# Patient Record
Sex: FEMALE | Race: WHITE | URBAN - METROPOLITAN AREA
[De-identification: names, ages, dates, MRNs, and addresses within clinical notes are randomized per-mention and may not be internally consistent; named-entity substitution may affect disease eponyms.]

---

## 2022-03-16 PROBLEM — O09.893 SHORT INTERVAL BETWEEN PREGNANCIES AFFECTING PREGNANCY IN THIRD TRIMESTER, ANTEPARTUM: Status: ACTIVE | Noted: 2022-03-16

## 2022-03-16 PROBLEM — O09.93 HIGH-RISK PREGNANCY IN THIRD TRIMESTER: Status: ACTIVE | Noted: 2022-03-16

## 2022-03-16 PROBLEM — O24.410 DIET CONTROLLED GESTATIONAL DIABETES MELLITUS (GDM) IN THIRD TRIMESTER: Status: ACTIVE | Noted: 2022-03-16

## 2022-03-16 PROBLEM — O99.013 ANEMIA DURING PREGNANCY IN THIRD TRIMESTER: Status: ACTIVE | Noted: 2022-03-16

## 2022-03-24 PROBLEM — O99.013 ANEMIA DURING PREGNANCY IN THIRD TRIMESTER: Status: ACTIVE | Noted: 2022-03-16

## 2022-03-24 PROBLEM — O24.410 DIET CONTROLLED GESTATIONAL DIABETES MELLITUS (GDM) IN THIRD TRIMESTER: Status: ACTIVE | Noted: 2022-03-16

## 2022-03-24 PROBLEM — O09.93 HIGH-RISK PREGNANCY IN THIRD TRIMESTER: Status: ACTIVE | Noted: 2022-03-16

## 2022-03-24 PROBLEM — O09.893 SHORT INTERVAL BETWEEN PREGNANCIES AFFECTING PREGNANCY IN THIRD TRIMESTER, ANTEPARTUM: Status: ACTIVE | Noted: 2022-03-16

## 2022-04-20 ENCOUNTER — HOSPITAL ENCOUNTER (OUTPATIENT)
Age: 37
Discharge: HOME | End: 2022-04-20
Attending: OBSTETRICS & GYNECOLOGY | Admitting: OBSTETRICS & GYNECOLOGY
Payer: OTHER GOVERNMENT

## 2022-04-20 VITALS
SYSTOLIC BLOOD PRESSURE: 110 MMHG | DIASTOLIC BLOOD PRESSURE: 69 MMHG | RESPIRATION RATE: 18 BRPM | TEMPERATURE: 98.6 F | OXYGEN SATURATION: 99 % | HEART RATE: 95 BPM

## 2022-04-20 PROBLEM — O41.03X0 OLIGOHYDRAMNIOS IN THIRD TRIMESTER: Status: ACTIVE | Noted: 2022-04-20

## 2022-04-20 PROBLEM — Z03.71 ENCOUNTER FOR SUSPECTED PREMATURE RUPTURE OF AMNIOTIC MEMBRANES, WITH RUPTURE OF MEMBRANES NOT FOUND: Status: ACTIVE | Noted: 2022-04-20

## 2022-04-20 LAB
A1 MICROGLOB PLACENTAL VAG QL: NEGATIVE
CONTROL LINE PRESENT?: NORMAL
EXPIRATION DATE: NORMAL
INTERNAL NEGATIVE CONTROL: NORMAL
KIT LOT NO.: NORMAL

## 2022-04-20 PROCEDURE — 59025 FETAL NON-STRESS TEST: CPT

## 2022-04-20 PROCEDURE — 84112 EVAL AMNIOTIC FLUID PROTEIN: CPT | Performed by: OBSTETRICS & GYNECOLOGY

## 2022-04-20 PROCEDURE — 99283 EMERGENCY DEPT VISIT LOW MDM: CPT

## 2022-04-20 NOTE — PROGRESS NOTES
Pt d/c home per Dr. James Jones order. Written and verbal d/c instructions and education given on general pregnancy. Pt verbalizes understanding and denies any questions. Will follow up with OB on Friday 4/22/22. Pt ambulated off [facility] escorted by self.

## 2022-04-20 NOTE — H&P
History & Physical    Name: Siddharth Kaufman MRN: [id number]  SSN: [id number]    YOB: 1985  Age: 31 y.o. Sex: female        Subjective: lof  32 yo  at 36+2wks presents from [facility] for evaluation of lof. Pt was seen at [facility] for EFW/BPP due to GDM, short interval pregnancy, and anemia. She was noted to have a low JOHN PAUL of 6.9cm and c/o \"wetness\" for a few weeks. She denies ctxs or vb. BPP was  with EFW 37%. Estimated Date of Delivery: 22  OB History    Para Term  AB Living   5 3 3   1 3   SAB IAB Ectopic Molar Multiple Live Births   1         3      # Outcome Date GA Lbr Lupillo/2nd Weight Sex Delivery Anes PTL Lv   5 Current            4 Term 21 39w2d / 00:18 3.646 kg F Vag-Spont EPI N BILLY   3 SAB 20 5w0d          2 Term 05/15/18 40w3d 10:51 / 00:12 3.379 kg F Vag-Spont Local N BILLY      Complications: Meconium staining   1 Term 17 40w0d 08:01 / 02:23 3. 118 kg F Vag-Spont EPI N BILLY       Past Medical History:   Diagnosis Date    Anemia      Past Surgical History:   Procedure Laterality Date    HX WISDOM TEETH EXTRACTION       Social History     Occupational History    Not on file   Tobacco Use    Smoking status: Never Smoker    Smokeless tobacco: Never Used   Substance and Sexual Activity    Alcohol use: Not Currently    Drug use: Never    Sexual activity: Not on file     History reviewed. No pertinent family history. Allergies   Allergen Reactions    Amoxicillin Hives     Pt sts had rash last time she had amoxicillin for strep     Prior to Admission medications    Medication Sig Start Date End Date Taking? Authorizing Provider   ferrous sulfate (IRON PO) Take  by mouth. Yes Provider, Historical   PNV XM.41/ONTRYHH fum/folic ac (PRENATAL PO) Take  by mouth daily. Yes Provider, Historical   hydrocortisone (HYTONE) 2.5 % topical cream Insert  into rectum.    Yes Provider, Historical   FreeStyle Lite Strips strip USE TO CHECK BLOOD SUGAR FOUR TIMES DAILY  Patient not taking: Reported on 2022 3/2/22   Provider, Historical   FreeStyle Lite Meter monitoring kit USE AS DIRECTED TO CHECK LEVELS FOUR TIMES DAILY  Patient not taking: Reported on 2022 3/11/22   Provider, Historical   FreeStyle Lancets 28 gauge misc USE AS DIRECTED TO CHECK LEVELS FOUR TIMES DAILY  Patient not taking: Reported on 2022 3/11/22   Provider, Historical        Review of Systems: Pertinent items are noted in HPI. 10 point ROS is otherwise negative. Objective:     Vitals:  Vitals:    22 1724   BP: 110/69   Pulse: 95   Resp: 18   Temp: 98.6 °F (37 °C)   SpO2: 99%        Physical Exam:  Patient without distress. Abdomen: soft, nontender  Fundus: soft and non tender  Perineum: blood absent, amniotic fluid absent  Lower Extremities:  - Edema 1+  Membranes:  Intact, Amnisure NEGATIVE  Fetal Heart Rate: Baseline: 130s per minute  Variability: moderate  Accelerations: yes  Decelerations: none  Uterine contractions: none    Prenatal Labs:   No results found for: ABORH, RUBELLAEXT, GRBSEXT, HBSAGEXT, HIVEXT, RPREXT, GONNOEXT, CHLAMEXT, ABORHEXT, RUBELLAEXT, GRBSEXT, HBSAGEXT, HIVEXT, RPREXT, GONNOEXT, CHLAMEXT      Assessment/Plan: 32 yo  at 36+2wks presents for lof. Amnisure resulted negative. I reviewed and interpreted the NST as a category 1 tracing. Active Problems:    Encounter for suspected premature rupture of amniotic membranes, with rupture of membranes not found (2022)         Plan:   MFM recommended in office BPP on Friday. This was communicated to the pt. Routine OB precautions.

## 2022-04-20 NOTE — PROGRESS NOTES
31year old  at 36.2 weeks gestation presents to [place] via ambulatory sent from [facility] for rule out rupture of membranes. Pt denies vaginal bleeding or contractions. EFHT 140s and reactive, no contractions noted on toco or palpation. Dr. Whitten Parent aware of pt arrival and negative amnisure result.

## 2022-04-20 NOTE — DISCHARGE INSTRUCTIONS
Patient Education        Learning About Pregnancy  Your Care Instructions     Your health in the early weeks of your pregnancy is particularly important for your baby's health. Take good care of yourself. Anything you do that harms your body can also harm your baby. Make sure to go to all of your doctor appointments. Regular checkups will help keep you and your baby healthy. How can you care for yourself at home? Diet    · Eat a balanced diet. Make sure your diet includes plenty of beans, peas, and leafy green vegetables.     · Do not skip meals or go for many hours without eating. If you are nauseated, try to eat a small, healthy snack every 2 to 3 hours.     · Do not eat fish that has a high level of mercury, such as shark, swordfish, or mackerel. Do not eat more than one can of tuna each week.     · Drink plenty of fluids. If you have kidney, heart, or liver disease and have to limit fluids, talk with your doctor before you increase the amount of fluids you drink.     · Cut down on caffeine, such as coffee, tea, and cola.     · Do not drink alcohol, such as beer, wine, or hard liquor.     · Take a multivitamin that contains at least 400 micrograms (mcg) of folic acid to help prevent birth defects. Fortified cereal and whole wheat bread are good additional sources of folic acid.     · Increase the calcium in your diet. Try to drink a quart of skim milk each day. You may also take calcium supplements and choose foods such as cheese and yogurt. Lifestyle    · Make sure you go to your follow-up appointments.     · Get plenty of rest. You may be unusually tired while you are pregnant.     · Get at least 30 minutes of exercise on most days of the week. Walking is a good choice. If you have not exercised in the past, start out slowly. Take several short walks each day.     · Do not smoke. If you need help quitting, talk to your doctor about stop-smoking programs.  These can increase your chances of quitting for good.     · Do not touch cat feces or litter boxes. Also, wash your hands after you handle raw meat, and fully cook all meat before you eat it. Wear gloves when you work in the yard or garden, and wash your hands well when you are done. Cat feces, raw or undercooked meat, and contaminated dirt can cause an infection that may harm your baby or lead to a miscarriage.     · Avoid things that can make your body too hot and may be harmful to your baby, such as a hot tub or sauna. Or talk with your doctor before doing anything that raises your body temperature. Your doctor can tell you if it's safe.     · Avoid chemical fumes, paint fumes, or poisons.     · Do not use illegal drugs, marijuana, or alcohol. Medicines    · Review all of your medicines with your doctor. Some of your routine medicines may need to be changed to protect your baby.     · Use acetaminophen (Tylenol) to relieve minor problems, such as a mild headache or backache or a mild fever with cold symptoms. Do not use nonsteroidal anti-inflammatory drugs (NSAIDs), such as ibuprofen (Advil, Motrin) or naproxen (Aleve), unless your doctor says it is okay.     · Do not take two or more pain medicines at the same time unless the doctor told you to. Many pain medicines have acetaminophen, which is Tylenol. Too much acetaminophen (Tylenol) can be harmful.     · Take your medicines exactly as prescribed. Call your doctor if you think you are having a problem with your medicine. To manage morning sickness    · If you feel sick when you first wake up, try eating a small snack (such as crackers) before you get out of bed. Allow some time to digest the snack, and then get out of bed slowly.     · Do not skip meals or go for long periods without eating.  An empty stomach can make nausea worse.     · Eat small, frequent meals instead of three large meals each day.     · Drink plenty of fluids.     · Eat foods that are high in protein but low in fat.     · If you are taking iron supplements, ask your doctor if they are necessary. Iron can make nausea worse.     · Avoid any smells, such as coffee, that make you feel sick.     · Get lots of rest. Morning sickness may be worse when you are tired. Follow-up care is a key part of your treatment and safety. Be sure to make and go to all appointments, and call your doctor if you are having problems. It's also a good idea to know your test results and keep a list of the medicines you take. Where can you learn more? Go to http://www.gray.com/  Enter Y263 in the search box to learn more about \"Learning About Pregnancy. \"  Current as of: June 16, 2021               Content Version: 13.2  © 2006-2022 [employer]. Care instructions adapted under license by [employer] (which disclaims liability or warranty for this information). If you have questions about a medical condition or this instruction, always ask your healthcare professional. [address] any warranty or liability for your use of this information.

## 2022-05-21 ENCOUNTER — HOSPITAL ENCOUNTER (INPATIENT)
Age: 37
LOS: 1 days | Discharge: HOME | End: 2022-05-22
Attending: OBSTETRICS & GYNECOLOGY | Admitting: OBSTETRICS & GYNECOLOGY
Payer: OTHER GOVERNMENT

## 2022-05-21 ENCOUNTER — ANESTHESIA (OUTPATIENT)
Dept: LABOR AND DELIVERY | Age: 37
End: 2022-05-21
Payer: OTHER GOVERNMENT

## 2022-05-21 ENCOUNTER — ANESTHESIA EVENT (OUTPATIENT)
Dept: LABOR AND DELIVERY | Age: 37
End: 2022-05-21
Payer: OTHER GOVERNMENT

## 2022-05-21 PROBLEM — O47.9 UTERINE CONTRACTIONS: Status: ACTIVE | Noted: 2022-05-21

## 2022-05-21 LAB
ABO + RH BLD: NORMAL
BLOOD GROUP ANTIBODIES SERPL: NORMAL
ERYTHROCYTE [DISTWIDTH] IN BLOOD BY AUTOMATED COUNT: 14.6 % (ref 11.9–14.6)
HCT VFR BLD AUTO: 36.6 % (ref 35.8–46.3)
HGB BLD-MCNC: 12.5 G/DL (ref 11.7–15.4)
MCH RBC QN AUTO: 30.2 PG (ref 26.1–32.9)
MCHC RBC AUTO-ENTMCNC: 34.2 G/DL (ref 31.4–35)
MCV RBC AUTO: 88.4 FL (ref 79.6–97.8)
NRBC # BLD: 0 K/UL (ref 0–0.2)
PLATELET # BLD AUTO: 242 K/UL (ref 150–450)
PMV BLD AUTO: 9.6 FL (ref 9.4–12.3)
RBC # BLD AUTO: 4.14 M/UL (ref 4.05–5.2)
SPECIMEN EXP DATE BLD: NORMAL
WBC # BLD AUTO: 10.5 K/UL (ref 4.3–11.1)

## 2022-05-21 PROCEDURE — 99283 EMERGENCY DEPT VISIT LOW MDM: CPT

## 2022-05-21 PROCEDURE — 2500000003 HC RX 250 WO HCPCS: Performed by: ANESTHESIOLOGY

## 2022-05-21 PROCEDURE — 2580000003 HC RX 258: Performed by: OBSTETRICS & GYNECOLOGY

## 2022-05-21 PROCEDURE — 7100000001 HC PACU RECOVERY - ADDTL 15 MIN

## 2022-05-21 PROCEDURE — 00HU33Z INSERTION OF INFUSION DEVICE INTO SPINAL CANAL, PERCUTANEOUS APPROACH: ICD-10-PCS | Performed by: ANESTHESIOLOGY

## 2022-05-21 PROCEDURE — 6360000002 HC RX W HCPCS: Performed by: REGISTERED NURSE

## 2022-05-21 PROCEDURE — 85027 COMPLETE CBC AUTOMATED: CPT

## 2022-05-21 PROCEDURE — 51701 INSERT BLADDER CATHETER: CPT

## 2022-05-21 PROCEDURE — 7210000100 HC LABOR FEE PER 1 HR

## 2022-05-21 PROCEDURE — 7220000101 HC DELIVERY VAGINAL/SINGLE

## 2022-05-21 PROCEDURE — 1100000000 HC RM PRIVATE

## 2022-05-21 PROCEDURE — 3700000156 HC EPIDURAL ANESTHESIA

## 2022-05-21 PROCEDURE — 7100000000 HC PACU RECOVERY - FIRST 15 MIN

## 2022-05-21 PROCEDURE — 10907ZC DRAINAGE OF AMNIOTIC FLUID, THERAPEUTIC FROM PRODUCTS OF CONCEPTION, VIA NATURAL OR ARTIFICIAL OPENING: ICD-10-PCS | Performed by: OBSTETRICS & GYNECOLOGY

## 2022-05-21 PROCEDURE — 4500000002 HC ER NO CHARGE

## 2022-05-21 PROCEDURE — 0HQ9XZZ REPAIR PERINEUM SKIN, EXTERNAL APPROACH: ICD-10-PCS | Performed by: OBSTETRICS & GYNECOLOGY

## 2022-05-21 PROCEDURE — 86901 BLOOD TYPING SEROLOGIC RH(D): CPT

## 2022-05-21 PROCEDURE — 6360000002 HC RX W HCPCS: Performed by: OBSTETRICS & GYNECOLOGY

## 2022-05-21 PROCEDURE — 6370000000 HC RX 637 (ALT 250 FOR IP): Performed by: OBSTETRICS & GYNECOLOGY

## 2022-05-21 PROCEDURE — 59300 EPISIOTOMY OR VAGINAL REPAIR: CPT

## 2022-05-21 PROCEDURE — 4A1HXCZ MONITORING OF PRODUCTS OF CONCEPTION, CARDIAC RATE, EXTERNAL APPROACH: ICD-10-PCS | Performed by: OBSTETRICS & GYNECOLOGY

## 2022-05-21 PROCEDURE — 2500000003 HC RX 250 WO HCPCS: Performed by: REGISTERED NURSE

## 2022-05-21 RX ORDER — OXYCODONE HYDROCHLORIDE 5 MG/1
10 TABLET ORAL EVERY 4 HOURS PRN
Status: DISCONTINUED | OUTPATIENT
Start: 2022-05-21 | End: 2022-05-23 | Stop reason: HOSPADM

## 2022-05-21 RX ORDER — SODIUM CHLORIDE, SODIUM LACTATE, POTASSIUM CHLORIDE, CALCIUM CHLORIDE 600; 310; 30; 20 MG/100ML; MG/100ML; MG/100ML; MG/100ML
INJECTION, SOLUTION INTRAVENOUS CONTINUOUS
Status: DISCONTINUED | OUTPATIENT
Start: 2022-05-21 | End: 2022-05-22

## 2022-05-21 RX ORDER — SODIUM CHLORIDE, SODIUM LACTATE, POTASSIUM CHLORIDE, AND CALCIUM CHLORIDE .6; .31; .03; .02 G/100ML; G/100ML; G/100ML; G/100ML
500 INJECTION, SOLUTION INTRAVENOUS PRN
Status: DISCONTINUED | OUTPATIENT
Start: 2022-05-21 | End: 2022-05-21 | Stop reason: HOSPADM

## 2022-05-21 RX ORDER — SODIUM CHLORIDE 9 MG/ML
25 INJECTION, SOLUTION INTRAVENOUS PRN
Status: DISCONTINUED | OUTPATIENT
Start: 2022-05-21 | End: 2022-05-21 | Stop reason: HOSPADM

## 2022-05-21 RX ORDER — ROPIVACAINE HYDROCHLORIDE 2 MG/ML
INJECTION, SOLUTION EPIDURAL; INFILTRATION; PERINEURAL CONTINUOUS PRN
Status: DISCONTINUED | OUTPATIENT
Start: 2022-05-21 | End: 2022-05-21 | Stop reason: SDUPTHER

## 2022-05-21 RX ORDER — MAGNESIUM CARB/ALUMINUM HYDROX 105-160MG
240 TABLET,CHEWABLE ORAL DAILY PRN
Status: DISCONTINUED | OUTPATIENT
Start: 2022-05-21 | End: 2022-05-21

## 2022-05-21 RX ORDER — LANOLIN 100 %
OINTMENT (GRAM) TOPICAL PRN
Status: DISCONTINUED | OUTPATIENT
Start: 2022-05-21 | End: 2022-05-23 | Stop reason: HOSPADM

## 2022-05-21 RX ORDER — HYDROCODONE BITARTRATE AND ACETAMINOPHEN 5; 325 MG/1; MG/1
1 TABLET ORAL EVERY 4 HOURS PRN
Status: DISCONTINUED | OUTPATIENT
Start: 2022-05-21 | End: 2022-05-21

## 2022-05-21 RX ORDER — ONDANSETRON 2 MG/ML
4 INJECTION INTRAMUSCULAR; INTRAVENOUS EVERY 6 HOURS PRN
Status: DISCONTINUED | OUTPATIENT
Start: 2022-05-21 | End: 2022-05-23 | Stop reason: HOSPADM

## 2022-05-21 RX ORDER — FAMOTIDINE 20 MG/1
20 TABLET, FILM COATED ORAL 2 TIMES DAILY
Status: DISCONTINUED | OUTPATIENT
Start: 2022-05-21 | End: 2022-05-23 | Stop reason: HOSPADM

## 2022-05-21 RX ORDER — POLYETHYLENE GLYCOL 3350 17 G/17G
17 POWDER, FOR SOLUTION ORAL DAILY
Status: DISCONTINUED | OUTPATIENT
Start: 2022-05-22 | End: 2022-05-23 | Stop reason: HOSPADM

## 2022-05-21 RX ORDER — IBUPROFEN 800 MG/1
TABLET ORAL
Status: DISPENSED
Start: 2022-05-21 | End: 2022-05-22

## 2022-05-21 RX ORDER — SODIUM CHLORIDE 0.9 % (FLUSH) 0.9 %
5-40 SYRINGE (ML) INJECTION EVERY 12 HOURS SCHEDULED
Status: DISCONTINUED | OUTPATIENT
Start: 2022-05-21 | End: 2022-05-23 | Stop reason: HOSPADM

## 2022-05-21 RX ORDER — ACETAMINOPHEN 325 MG/1
650 TABLET ORAL EVERY 4 HOURS PRN
Status: DISCONTINUED | OUTPATIENT
Start: 2022-05-21 | End: 2022-05-21 | Stop reason: HOSPADM

## 2022-05-21 RX ORDER — IBUPROFEN 800 MG/1
800 TABLET ORAL EVERY 6 HOURS
Status: DISCONTINUED | OUTPATIENT
Start: 2022-05-21 | End: 2022-05-23 | Stop reason: HOSPADM

## 2022-05-21 RX ORDER — ONDANSETRON 4 MG/1
8 TABLET, ORALLY DISINTEGRATING ORAL EVERY 8 HOURS PRN
Status: DISCONTINUED | OUTPATIENT
Start: 2022-05-21 | End: 2022-05-23 | Stop reason: HOSPADM

## 2022-05-21 RX ORDER — IBUPROFEN 800 MG/1
800 TABLET ORAL EVERY 8 HOURS SCHEDULED
Status: DISCONTINUED | OUTPATIENT
Start: 2022-05-21 | End: 2022-05-21 | Stop reason: HOSPADM

## 2022-05-21 RX ORDER — SODIUM CHLORIDE, SODIUM LACTATE, POTASSIUM CHLORIDE, CALCIUM CHLORIDE 600; 310; 30; 20 MG/100ML; MG/100ML; MG/100ML; MG/100ML
INJECTION, SOLUTION INTRAVENOUS CONTINUOUS
Status: DISCONTINUED | OUTPATIENT
Start: 2022-05-21 | End: 2022-05-21 | Stop reason: HOSPADM

## 2022-05-21 RX ORDER — SODIUM CHLORIDE 0.9 % (FLUSH) 0.9 %
5-40 SYRINGE (ML) INJECTION PRN
Status: DISCONTINUED | OUTPATIENT
Start: 2022-05-21 | End: 2022-05-21 | Stop reason: HOSPADM

## 2022-05-21 RX ORDER — SODIUM CHLORIDE 0.9 % (FLUSH) 0.9 %
5-40 SYRINGE (ML) INJECTION EVERY 12 HOURS SCHEDULED
Status: DISCONTINUED | OUTPATIENT
Start: 2022-05-21 | End: 2022-05-21 | Stop reason: HOSPADM

## 2022-05-21 RX ORDER — SODIUM CHLORIDE 0.9 % (FLUSH) 0.9 %
5-40 SYRINGE (ML) INJECTION PRN
Status: DISCONTINUED | OUTPATIENT
Start: 2022-05-21 | End: 2022-05-23 | Stop reason: HOSPADM

## 2022-05-21 RX ORDER — DOCUSATE SODIUM 100 MG/1
100 CAPSULE, LIQUID FILLED ORAL 2 TIMES DAILY
Status: DISCONTINUED | OUTPATIENT
Start: 2022-05-21 | End: 2022-05-21

## 2022-05-21 RX ORDER — SODIUM CHLORIDE, SODIUM LACTATE, POTASSIUM CHLORIDE, AND CALCIUM CHLORIDE .6; .31; .03; .02 G/100ML; G/100ML; G/100ML; G/100ML
1000 INJECTION, SOLUTION INTRAVENOUS PRN
Status: DISCONTINUED | OUTPATIENT
Start: 2022-05-21 | End: 2022-05-21 | Stop reason: HOSPADM

## 2022-05-21 RX ORDER — ONDANSETRON 2 MG/ML
4 INJECTION INTRAMUSCULAR; INTRAVENOUS EVERY 6 HOURS PRN
Status: DISCONTINUED | OUTPATIENT
Start: 2022-05-21 | End: 2022-05-21 | Stop reason: HOSPADM

## 2022-05-21 RX ORDER — SODIUM CHLORIDE 9 MG/ML
INJECTION, SOLUTION INTRAVENOUS PRN
Status: DISCONTINUED | OUTPATIENT
Start: 2022-05-21 | End: 2022-05-22

## 2022-05-21 RX ORDER — SIMETHICONE 80 MG
80 TABLET,CHEWABLE ORAL EVERY 6 HOURS PRN
Status: DISCONTINUED | OUTPATIENT
Start: 2022-05-21 | End: 2022-05-23 | Stop reason: HOSPADM

## 2022-05-21 RX ORDER — LIDOCAINE HYDROCHLORIDE AND EPINEPHRINE 15; 5 MG/ML; UG/ML
INJECTION, SOLUTION EPIDURAL PRN
Status: DISCONTINUED | OUTPATIENT
Start: 2022-05-21 | End: 2022-05-21 | Stop reason: SDUPTHER

## 2022-05-21 RX ORDER — HYDROMORPHONE HYDROCHLORIDE 1 MG/ML
0.5 INJECTION, SOLUTION INTRAMUSCULAR; INTRAVENOUS; SUBCUTANEOUS
Status: DISCONTINUED | OUTPATIENT
Start: 2022-05-21 | End: 2022-05-23 | Stop reason: HOSPADM

## 2022-05-21 RX ORDER — DOCUSATE SODIUM 100 MG/1
100 CAPSULE, LIQUID FILLED ORAL 2 TIMES DAILY PRN
Status: DISCONTINUED | OUTPATIENT
Start: 2022-05-21 | End: 2022-05-23 | Stop reason: HOSPADM

## 2022-05-21 RX ORDER — ACETAMINOPHEN 500 MG
1000 TABLET ORAL EVERY 6 HOURS
Status: DISCONTINUED | OUTPATIENT
Start: 2022-05-21 | End: 2022-05-23 | Stop reason: HOSPADM

## 2022-05-21 RX ORDER — EPHEDRINE SULFATE/0.9% NACL/PF 50 MG/5 ML
SYRINGE (ML) INTRAVENOUS PRN
Status: DISCONTINUED | OUTPATIENT
Start: 2022-05-21 | End: 2022-05-21 | Stop reason: SDUPTHER

## 2022-05-21 RX ORDER — OXYCODONE HYDROCHLORIDE 5 MG/1
5 TABLET ORAL EVERY 4 HOURS PRN
Status: DISCONTINUED | OUTPATIENT
Start: 2022-05-21 | End: 2022-05-23 | Stop reason: HOSPADM

## 2022-05-21 RX ADMIN — Medication 2 MILLI-UNITS/MIN: at 13:38

## 2022-05-21 RX ADMIN — Medication: at 23:40

## 2022-05-21 RX ADMIN — ROPIVACAINE HYDROCHLORIDE 8 ML/HR: 2 INJECTION, SOLUTION EPIDURAL; INFILTRATION; PERINEURAL at 16:31

## 2022-05-21 RX ADMIN — LIDOCAINE HYDROCHLORIDE,EPINEPHRINE BITARTRATE 5 ML: 15; .005 INJECTION, SOLUTION EPIDURAL; INFILTRATION; INTRACAUDAL; PERINEURAL at 16:24

## 2022-05-21 RX ADMIN — Medication 15 MG: at 16:37

## 2022-05-21 RX ADMIN — WITCH HAZEL 40 EACH: 500 SOLUTION RECTAL; TOPICAL at 23:38

## 2022-05-21 RX ADMIN — SODIUM CHLORIDE, POTASSIUM CHLORIDE, SODIUM LACTATE AND CALCIUM CHLORIDE: 600; 310; 30; 20 INJECTION, SOLUTION INTRAVENOUS at 13:38

## 2022-05-21 RX ADMIN — Medication 909 MILLI-UNITS/MIN: at 18:19

## 2022-05-21 RX ADMIN — SODIUM CHLORIDE, POTASSIUM CHLORIDE, SODIUM LACTATE AND CALCIUM CHLORIDE 500 ML: 600; 310; 30; 20 INJECTION, SOLUTION INTRAVENOUS at 13:45

## 2022-05-21 RX ADMIN — IBUPROFEN 800 MG: 800 TABLET, FILM COATED ORAL at 21:34

## 2022-05-21 RX ADMIN — OXYCODONE 5 MG: 5 TABLET ORAL at 23:39

## 2022-05-21 ASSESSMENT — PAIN DESCRIPTION - DESCRIPTORS: DESCRIPTORS: CRAMPING

## 2022-05-21 ASSESSMENT — PAIN SCALES - GENERAL: PAINLEVEL_OUTOF10: 5

## 2022-05-21 ASSESSMENT — PAIN DESCRIPTION - LOCATION: LOCATION: ABDOMEN

## 2022-05-21 ASSESSMENT — PAIN DESCRIPTION - ORIENTATION: ORIENTATION: ANTERIOR;LOWER

## 2022-05-21 NOTE — PROGRESS NOTES
1037 Pt. Arrived to [place] [address] for rule out labor. EFM and toco applied per [facility]. Dr. Jennyfer Meier called per RN.  7713 Dr. Jennyfer Meier at bedside reviewing FHR tracing. SVE per Dr. Jennyfer Meier. Admit to inpatient orders received. 1127 Pt. Transferred to L&D room 433 ambulatory with RN. Pt. Prefers to be ambulatory. Mikie Dodsonudent telemetry applied per RN. 46 Dr. Federico Junior at bedside reviewing FHR tracing. Pt. Declines AROM at this time. Orders for Pitocin per protocol received. 1338 Pitocin started at 2mu as ordered. Hunzepad 139 per RN. 80/-2. Pt. Desires natural childbirth at this time. 2 [place] Pope per RN. 80/-2. Pt. Considering AROM at this time. Pt. Requesting epidural at this time. Fluid bolus started. Dr. Daisy Heck and Dennis Gamino called per RN for epidural placement. Raven Heck and Evans Del Rosario CRNA at bedside for epidural placement. See anesthesia record. 1650 Pt. Requesting AROM at this time. Dr. Federico Junior at bedside reviewing FHR tracing. SVE per Dr. Federico Junior. AROM per Dr. Federico Junior. Clear fluids noted. 1733 SVE per RN 7/80/0. Pt. Repositioned to right side with peanut ball. 1750 Pt. Repositioned to side lying release on the left. 1758 Pt. Repositioned to side lying release on the right. 1800 SVE per RN. 10/100/+2. Dr. Federico Junior called per RN for delivery. 1816  of viable boy. Apgars 8/9. See delivery.

## 2022-05-21 NOTE — ANESTHESIA PRE PROCEDURE
Department of Anesthesiology  Preprocedure Note       Name:  Boyb Andrade   Age:  31 y.o.  :  1985                                          MRN:  [id number]         Date:  2022      Surgeon: * No surgeons listed *    Procedure: * No procedures listed *    Medications prior to admission:   Prior to Admission medications    Medication Sig Start Date End Date Taking?  Authorizing Provider   Prenatal Vit w/Lj-Bemuuregp-YE (PNV PO) Take 1 tablet by mouth daily   Yes Historical Provider, MD   hydrocortisone 2.5 % cream Place rectally    Ar Automatic Reconciliation       Current medications:    Current Facility-Administered Medications   Medication Dose Route Frequency Provider Last Rate Last Admin    lactated ringers infusion   IntraVENous Continuous Zack Renteria  mL/hr at 22 1338 New Bag at 22 1338    lactated ringers bolus  500 mL IntraVENous PRN Zack Renteria MD        Or    lactated ringers bolus  1,000 mL IntraVENous PRN Zack Renteria MD        sodium chloride flush 0.9 % injection 5-40 mL  5-40 mL IntraVENous 2 times per day Zack Renteria MD        sodium chloride flush 0.9 % injection 5-40 mL  5-40 mL IntraVENous PRN Zack Renteria MD        0.9 % sodium chloride infusion  25 mL IntraVENous PRN Zack Renteria MD        butorphanol (STADOL) injection 1 mg  1 mg IntraVENous Q3H PRN Zack Renteria MD        ondansetron [facility]) injection 4 mg  4 mg IntraVENous Q6H PRN Zack Renteria MD        acetaminophen (TYLENOL) tablet 650 mg  650 mg Oral Q4H PRN Zack Renteria MD        ibuprofen (ADVIL;MOTRIN) tablet 800 mg  800 mg Oral 3 times per day Zack Renteria MD        HYDROcodone-acetaminophen (NORCO) 5-325 MG per tablet 1 tablet  1 tablet Oral Q4H PRN Zack Renteria MD        benzocaine-menthol (DERMOPLAST) 20-0.5 % spray   Topical PRN Zack Renteria MD        docusate sodium (COLACE) capsule 100 mg  100 mg Oral BID MD Ej Falcon oxytocin (PITOCIN) 30 units in 500 mL infusion  1-20 nisa-units/min IntraVENous Continuous José Miguel Archer MD 4 mL/hr at 05/21/22 1415 4 nisa-units/min at 05/21/22 1415       Allergies: Allergies   Allergen Reactions    Amoxicillin Hives     Pt sts had rash last time she had amoxicillin for strep       Problem List:    Patient Active Problem List   Diagnosis Code    High-risk pregnancy in third trimester O09.93    Diet controlled gestational diabetes mellitus (GDM) in third trimester O20.18    Anemia during pregnancy in third trimester O99.013    Short interval between pregnancies affecting pregnancy in third trimester, antepartum O09.893    Oligohydramnios in third trimester O41. 5X0    Encounter for suspected premature rupture of amniotic membranes, with rupture of membranes not found Z03.71    Uterine contractions O47.9    Labor abnormal O62.9       Past Medical History:        Diagnosis Date    Anemia        Past Surgical History:        Procedure Laterality Date    WISDOM TOOTH EXTRACTION         Social History:    Social History     Tobacco Use    Smoking status: Never Smoker    Smokeless tobacco: Never Used   Substance Use Topics    Alcohol use: Not Currently                                Counseling given: Not Answered      Vital Signs (Current):   Vitals:    05/21/22 1415 05/21/22 1623 05/21/22 1625 05/21/22 1628   BP: 134/81 120/72 114/71 124/66   Pulse: 120 100 101 104   Resp:       Temp:       TempSrc:       Weight:       Height:                                                  BP Readings from Last 3 Encounters:   05/21/22 124/66   03/16/22 112/72       NPO Status:                                                                                 BMI:   Wt Readings from Last 3 Encounters:   05/21/22 170 lb (77.1 kg)     Body mass index is 29.18 kg/m².     CBC:   Lab Results   Component Value Date    WBC 10.5 05/21/2022    RBC 4.14 05/21/2022    HGB 12.5 05/21/2022    HCT 36.6 05/21/2022 MCV 88.4 05/21/2022    RDW 14.6 05/21/2022     05/21/2022       CMP: No results found for: NA, K, CL, CO2, BUN, CREATININE, GFRAA, AGRATIO, LABGLOM, GLUCOSE, GLU, PROT, CALCIUM, BILITOT, ALKPHOS, AST, ALT    POC Tests: No results for input(s): POCGLU, POCNA, POCK, POCCL, POCBUN, POCHEMO, POCHCT in the last 72 hours. Coags: No results found for: PROTIME, INR, APTT    HCG (If Applicable): No results found for: PREGTESTUR, PREGSERUM, HCG, HCGQUANT     ABGs: No results found for: PHART, PO2ART, YVK3EWV, NLG5JOK, BEART, X8DTMYCW     Type & Screen (If Applicable):  No results found for: LABABO, LABRH    Drug/Infectious Status (If Applicable):  No results found for: HIV, HEPCAB    COVID-19 Screening (If Applicable): No results found for: COVID19        Anesthesia Evaluation  Patient summary reviewed and Nursing notes reviewed no history of anesthetic complications:   Airway: Mallampati: II          Dental: normal exam         Pulmonary:Negative Pulmonary ROS breath sounds clear to auscultation                             Cardiovascular:Negative CV ROS  Exercise tolerance: good (>4 METS),                     Neuro/Psych:   Negative Neuro/Psych ROS              GI/Hepatic/Renal: Neg GI/Hepatic/Renal ROS            Endo/Other: Negative Endo/Other ROS                    Abdominal:             Vascular: negative vascular ROS. Other Findings:           Anesthesia Plan      epidural     ASA 2             Anesthetic plan and risks discussed with patient and spouse.                       Joel Davis MD   5/21/2022

## 2022-05-21 NOTE — H&P
[unfilled]  History & Physical    Name: Jessica Cannon MRN: [id number]  SSN: [id number]    YOB: 1985  Age: 31 y.o. Sex: female      Chief Complaint   Patient presents with    Contractions      Subjective:     Estimated Date of Delivery: 22  OB History    Para Term  AB Living   6   3   1 3   SAB IAB Ectopic Molar Multiple Live Births                    # Outcome Date GA Lbr Christoph/2nd Weight Sex Delivery Anes PTL Lv   1 Current                Ms. Bambi Chan is seen with pregnancy at 40w5d for labor admission. has been having contractions on/off for a week. had become more painful and unable to walk. is scheduled for induction on monday. was 2 cm in office. has lost mucus plug, no vb or leaking fluid. .   Prenatal course was complicated by gdm earlier in pregnancy but had been dismissed for normal sugars. .  the patients states that the baby moves as usual  Please see prenatal records for additional details. Past Medical History:   Diagnosis Date    Anemia      Past Surgical History:   Procedure Laterality Date    WISDOM TOOTH EXTRACTION       Social History     Occupational History    Not on file   Tobacco Use    Smoking status: Never Smoker    Smokeless tobacco: Never Used   Substance and Sexual Activity    Alcohol use: Not Currently    Drug use: Never    Sexual activity: Not on file     History reviewed. No pertinent family history. Allergies   Allergen Reactions    Amoxicillin Hives     Pt sts had rash last time she had amoxicillin for strep     Prior to Admission medications    Medication Sig Start Date End Date Taking?  Authorizing Provider   Prenatal Vit w/Ii-Qhoamdmok-PA (PNV PO) Take 1 tablet by mouth daily   Yes Historical Provider, MD   hydrocortisone 2.5 % cream Place rectally    Ar Automatic Reconciliation        Review of Systems:  10 point ROS negative other than noted in HPI      Objective:     Vitals:  Vitals:    22 1038   BP: 120/86 Pulse: 136   Resp: 17   Temp: 97.5 °F (36.4 °C)   TempSrc: Oral   Weight: 170 lb (77.1 kg)   Height: 5' 4\" (1.626 m)        Physical Exam:  Patient without distress. Heart: Regular rate and rhythm  Lung: clear to auscultation throughout lung fields, no wheezes, no rales, no rhonchi and normal respiratory effort  Abdomen: soft, nontender, without guarding, without rebound  Fundus: soft and non tender  Cervical Exam: 5 cm dilated    50% effaced    -2 station    Presenting Part: cephalic  Lower Extremities:  - Edema 1+  Membranes:  Intact  Fetal Heart Rate tracing: Category 1  Uterine contractions: irregular     Prenatal Labs:   gbs negative    Assessment/Plan:     Ms. Joseph Joshi is a [id number] seen with pregnancy at 40w5d for labor.        Plan:     Admit for labor management    Patient discussed with Dr. Roopa Chow By:  Carlos Allen MD     May 21, 2022

## 2022-05-21 NOTE — L&D DELIVERY NOTE
Mother's Information    Labor Events     Labor?: No  Cervical Ripening:   Now         Karlie Pippins [[id number]]    Labor Events     Labor?: No   Steroids?: None  Cervical Ripening Date/Time:     Antibiotics Received during Labor?: No  Rupture Date/Time: 22 16:50:00   Rupture Type: AROM  Fluid Color: Clear  Fluid Odor: None  Fluid Volume: Moderate  Augmentation: AROM, Oxytocin  Labor Complications: None     Anesthesia    Method: Epidural     Start Pushing    Labor onset date/time: 22 10:37:00 Now   Dilation complete date/time:   Now   Start pushing date/time:    Decision date/time (emergent ):       Delivery ()    Delivery Date/Time:  22 18:16:00   Delivery Type: Vaginal, Spontaneous    Details:         Presentation    Presentation: Vertex  Position: Left  _: Occiput  _: Anterior     Shoulder Dystocia    Shoulder Dystocia Present?: No  Add Second Maneuver  Add Third Maneuver  Add Fourth Maneuver  Add Fifth Maneuver  Add Sixth Maneuver  Add Seventh Maneuver  Add Eighth Maneuver  Add Ninth Maneuver     Assisted Delivery Details    Forceps Attempted?: No  Vacuum Extractor Attempted?: No     Document Additional Attempt       Document Additional Attempt             Cord    Vessels: 3 Vessels  Complications: None  Delayed Cord Clamping?: No  Cord Blood Disposition: Lab  Gases Sent?: No     Placenta    Date/Time: 2022 18:19:00  Removal: Spontaneous  Appearance: Intact  Disposition: Discarded     Lacerations    Episiotomy: None  Perineal Lacerations: 1st  Number of Repair Packets: 1     Vaginal Counts    Initial Count Personnel: DOUGLAS ANDERSON  Initial Count Verified By: Jennifer Tim RN    Sponges Needles Instruments   Initial Counts Correct Correct    Final Counts Correct Correct    Final Count Personnel: DOUGLAS ANDERSON  Final Count Verified By: Jennifer Tim RN  Accurate Final Count?: Yes  If the count is incorrect due to Intentionally Retained Foreign Object (IRFO) add the IRFO LDA in Lines/Drains. Add LDA: Link to [facility]     Blood Loss  Mother: Tamiko Nascimento #[id number]   Start of Mother's Information    Delivery Blood Loss  22 1037 - 22 1840    None           End of Mother's Information  Mother: Tamiko Nascimento #[id number]          Delivery Providers    Delivering clinician: Carolin Garcia MD     Provider Role    Carolin Garcia MD Obstetrician    Gideon Watters, SHAINA Primary Nurse    Alex Turner RN Primary  Nurse    Elier Galvan MD Neonatologist    [address] Respiratory Therapist (Day)    Patrick Shipman [facility]           Assessment    Living Status: Living     Apgar Scoring Key:    0 1 2    Skin Color: Blue or pale Acrocyanotic Completely pink    Heart Rate: Absent <100 bpm >100 bpm    Reflex Irritability: No response Grimace Cry or active withdrawal    Muscle Tone: Limp Some flexion Active motion    Respiratory Effort: Absent Weak cry; hypoventilation Good, crying                  Skin Color:   Heart Rate:   Reflex Irritability:   Muscle Tone:   Respiratory Effort: Total:            1 Minute:    0    2    2    2    2    8        Apgar 1 total from OB History    5 Minute:    1    2    2    2    2    9        Apgar 5 total from OB History    10 Minute:              15 Minute:              20 Minute:                      Apgars Assigned By: DR. DOS SANTOS          Resuscitation    Method: Bulb Suction, Stimulation            Measurements    Birth Weight: 3420 g Birth Length: 0.55 m   Head Circumference: 0.335 m Chest Circumference: 0.335 m          Title    Skin to Skin Initiation Date/Time: 22 18:26:48 EDT   Skin to Skin With: Mother   Skin to Skin End Date/Time:                Delivery Note    Obstetrician:  Alesia Pritchard MD    Assistant: none    Pre-Delivery Diagnosis: Term pregnancy, Spontaneous labor and Single fetus    Post-Delivery Diagnosis: Living  infant(s) and Male    Intrapartum Event: Extended fetal bradycardia    Procedure: Spontaneous vaginal delivery    Epidural: yes    Monitor:  Fetal Heart Tones - External and Uterine Contractions - External    Indications for instrumental delivery: none    Estimated Blood Loss: seeqbl    Episiotomy: none    Laceration(s):  1st degree    Laceration(s) repair: yes    Presentation: Cephalic    Fetal Description: ng    Fetal Position: Left Occiput Anterior    BABY INFORMATION  NAME:   Information for the patient's :  Juan Alberto Kamilla [[id number]]   Milka Robertson59: female  DATE AND TIME OF BIRTH:   Information for the patient's :  Juan Alberto Kohli [[id number]]   2020    at   Information for the patient's :  Juan Alberto Kohli [[id number]]   [facility]   Information for the patient's :  Juan Alberto Kohli [[id number]]       and   Information for the patient's :  Juan Alberto Kohli [[id number]]      . APGARS:  Information for the patient's :  Juan Alberto Kamilla [[id number]]         Information for the patient's :  Juan Albertofranky Kohli [[id number]]          Umbilical Cord: 3 vessels present and Cord blood sent to lab for type, Rh, and Honey' test    Specimens: na           Complications:  none           Lab Results   Component Value Date    82 Trina VALERIO POSITIVE 2022            Attending Attestation: I was present and scrubbed for the entire procedure.

## 2022-05-21 NOTE — ANESTHESIA PROCEDURE NOTES
Epidural Block    Patient location during procedure: OB  Start time: 5/21/2022 4:17 PM  End time: 5/21/2022 4:27 PM  Reason for block: labor epidural  Staffing  Performed: anesthesiologist   Anesthesiologist: Chin Perez MD  Preanesthetic Checklist  Completed: patient identified, IV checked, site marked, risks and benefits discussed, surgical consent, monitors and equipment checked, pre-op evaluation, timeout performed, anesthesia consent given, oxygen available and patient being monitored  Epidural  Patient position: sitting  Prep: ChloraPrep  Patient monitoring: continuous pulse ox and frequent blood pressure checks  Approach: midline  Location: L3-4  Injection technique: SPENCER air  Provider prep: mask, sterile gloves and sterile gown  Needle  Needle type: Tuohy   Needle gauge: 17 G  Needle length: 3.5 in  Needle insertion depth: 6 cm  Catheter type: end hole  Catheter size: 18 G  Catheter at skin depth: 11 cm  Test dose: negativeCatheter Secured: tegaderm and tape  Assessment  Hemodynamics: stable  Attempts: 1Outcomes: uncomplicated

## 2022-05-21 NOTE — PROGRESS NOTES
Discussed AROM with pt since she is not ctxing and has not changed her cx. Discussed Pit aug also. Pt only wants AROM.

## 2022-05-22 VITALS
HEIGHT: 64 IN | TEMPERATURE: 98.9 F | OXYGEN SATURATION: 98 % | DIASTOLIC BLOOD PRESSURE: 61 MMHG | RESPIRATION RATE: 16 BRPM | WEIGHT: 170 LBS | HEART RATE: 88 BPM | SYSTOLIC BLOOD PRESSURE: 101 MMHG | BODY MASS INDEX: 29.02 KG/M2

## 2022-05-22 LAB
GLUCOSE BLD STRIP.AUTO-MCNC: 94 MG/DL (ref 65–100)
SERVICE CMNT-IMP: NORMAL

## 2022-05-22 PROCEDURE — 6370000000 HC RX 637 (ALT 250 FOR IP): Performed by: OBSTETRICS & GYNECOLOGY

## 2022-05-22 PROCEDURE — 82962 GLUCOSE BLOOD TEST: CPT

## 2022-05-22 RX ORDER — OXYCODONE HYDROCHLORIDE 5 MG/1
5 TABLET ORAL EVERY 4 HOURS PRN
Qty: 20 TABLET | Refills: 0 | Status: SHIPPED | OUTPATIENT
Start: 2022-05-22 | End: 2022-05-25

## 2022-05-22 RX ORDER — IBUPROFEN 800 MG/1
800 TABLET ORAL EVERY 6 HOURS
Qty: 120 TABLET | Refills: 3 | Status: SHIPPED | OUTPATIENT
Start: 2022-05-22

## 2022-05-22 RX ADMIN — ACETAMINOPHEN 1000 MG: 500 TABLET, FILM COATED ORAL at 18:11

## 2022-05-22 RX ADMIN — ACETAMINOPHEN 1000 MG: 500 TABLET, FILM COATED ORAL at 12:17

## 2022-05-22 RX ADMIN — POLYETHYLENE GLYCOL 3350 17 G: 17 POWDER, FOR SOLUTION ORAL at 09:52

## 2022-05-22 RX ADMIN — IBUPROFEN 800 MG: 800 TABLET, FILM COATED ORAL at 15:58

## 2022-05-22 RX ADMIN — ACETAMINOPHEN 1000 MG: 500 TABLET, FILM COATED ORAL at 06:42

## 2022-05-22 RX ADMIN — OXYCODONE 5 MG: 5 TABLET ORAL at 05:51

## 2022-05-22 RX ADMIN — IBUPROFEN 800 MG: 800 TABLET, FILM COATED ORAL at 03:40

## 2022-05-22 RX ADMIN — IBUPROFEN 800 MG: 800 TABLET, FILM COATED ORAL at 09:50

## 2022-05-22 RX ADMIN — ACETAMINOPHEN 1000 MG: 500 TABLET, FILM COATED ORAL at 00:36

## 2022-05-22 RX ADMIN — OXYCODONE 5 MG: 5 TABLET ORAL at 11:47

## 2022-05-22 ASSESSMENT — PAIN SCALES - GENERAL
PAINLEVEL_OUTOF10: 4
PAINLEVEL_OUTOF10: 4

## 2022-05-22 ASSESSMENT — PAIN DESCRIPTION - LOCATION: LOCATION: ABDOMEN

## 2022-05-22 ASSESSMENT — PAIN DESCRIPTION - DESCRIPTORS: DESCRIPTORS: CRAMPING

## 2022-05-22 NOTE — ANESTHESIA POSTPROCEDURE EVALUATION
Department of Anesthesiology  Postprocedure Note    Patient: Myrtle Joseph  MRN: [id number]  YOB: 1985  Date of evaluation: 5/22/2022  Time:  7:17 AM     Procedure Summary     Date: 05/21/22 Room / Location:     Anesthesia Start: 4464 Anesthesia Stop: 1816    Procedure: Labor Analgesia Diagnosis:     Scheduled Providers:  Responsible Provider: Jeffry Baeza MD    Anesthesia Type: epidural ASA Status: 2          Anesthesia Type: epidural    Jesus Phase I:      Jesus Phase II:      Last vitals: Reviewed and per EMR flowsheets.        Anesthesia Post Evaluation    Patient location during evaluation: bedside  Patient participation: complete - patient participated  Level of consciousness: awake and alert  Airway patency: patent  Nausea & Vomiting: no vomiting and no nausea  Complications: no  Cardiovascular status: hemodynamically stable  Respiratory status: nonlabored ventilation and spontaneous ventilation  Hydration status: euvolemic  Comments: Lower extremity neurological function returned to baseline per patient  Multimodal analgesia pain management approach

## 2022-05-22 NOTE — LACTATION NOTE
This note was copied from a baby's chart. Lactation visit. 4th time mom, nursed last baby 12 months, only recently weaned. Experienced breastfeeder. Requesting 24 hour discharge tonight. Baby latched now. Left breast, cradle hold. Tender nipples, latch is a bit shallow. Mom noted baby to have recessed chin, dimple in chin. Reviewed breast compression and also manual lip flange. Did have improved comfort with lip flange. Nipple very mildly compressed on release. Mom independently latched baby to right breast also. Again latch on pain and a little narrow, improved with manual lip flange. Audible swallowing on right breast. Will continue to work on latch. Mom doing well. Mom noted first child also with slightly recessed chin.

## 2022-05-22 NOTE — PLAN OF CARE
Problem: Pain  Goal: Verbalizes/displays adequate comfort level or baseline comfort level  2022 1406 by Samir Beckwith RN  Outcome: Progressing     Problem: Vaginal Birth or  Section  Goal: Fetal and maternal status remain reassuring during the birth process  Description:  Birth OB-Pregnancy care plan goal which identifies if the fetal and maternal status remain reassuring during the birth process  2022 1406 by Samir Beckwith RN  Outcome: Progressing     Problem: Postpartum  Goal: Experiences normal postpartum course  Description:  Postpartum OB-Pregnancy care plan goal which identifies if the mother is experiencing a normal postpartum course  Outcome: Progressing  Goal: Appropriate maternal -  bonding  Description:  Postpartum OB-Pregnancy care plan goal which identifies if the mother and  are bonding appropriately  Outcome: Progressing  Goal: Establishment of infant feeding pattern  Description:  Postpartum OB-Pregnancy care plan goal which identifies if the mother is establishing a feeding pattern with their   Outcome: Progressing  Goal: Incisions, wounds, or drain sites healing without S/S of infection  Outcome: Progressing     Problem: Infection - Adult  Goal: Absence of infection during hospitalization  2022 1406 by Samir Beckwith RN  Outcome: Progressing  Goal: Absence of fever/infection during anticipated neutropenic period  2022 1406 by Samir Beckwith RN  Outcome: Progressing     Problem: Safety - Adult  Goal: Free from fall injury  2022 1406 by Samir Beckwith RN  Outcome: Progressing

## 2022-05-22 NOTE — PROGRESS NOTES
Patient up to bathroom with minimal assistance. Magali-care taught and completed. Bed pad and gown changed. Questions encouraged and answered. Patient ambulating without difficulty, encouraged to call for needs or concerns and when to notify staff of bleeding/clots. Verbalizes understanding.

## 2022-05-22 NOTE — PROGRESS NOTES
SBAR IN Report: Mother    Verbal report received from [place], RN on this patient, who is now being transferred from L&D for routine progression of patient care. The patient is not wearing a green \"Anesthesia-Duramorph\" band. Report consisted of patient's Situation, Background, Assessment and Recommendations (SBAR).  ID bands were compared with the identification form, and verified with the patient and transferring nurse. Information from the Nurse Handoff Report and the [place] Report was reviewed with the transferring nurse; opportunity for questions and clarification provided.

## 2022-05-22 NOTE — LACTATION NOTE

## 2022-05-22 NOTE — DISCHARGE SUMMARY
Obstetrical Discharge Summary     Name: Papo Casarez MRN: [id number]  SSN: [id number]    YOB: 1994  Age: 24 y.o. Sex: female      Allergies: Patient has no known allergies. Admit Date: 2022    Discharge Date: 5/15/2022     Admitting Physician: Dustin Bah MD     Attending Physician:  Andres Vaz MD     * Admission Diagnoses: Hypertension affecting pregnancy [O16.9]  Preeclampsia, third trimester [O14.93]  IUGR (intrauterine growth restriction) affecting care of mother, third trimester, other fetus [O39.36]    * Discharge Diagnoses:   Information for the patient's :  Tiana Toledo [[id number]]   Delivery of a 2.09 kg male infant via Vaginal, Spontaneous on 5/15/2022 at 11:49 AM  by Dustin Bah. Apgars were 8  and 9 . Additional Diagnoses:   Hospital Problems as of 5/15/2022 Never Reviewed            Codes Class Noted - Resolved POA    Preeclampsia, third trimester ICD-10-CM: O14.93  ICD-9-CM: 642.43  2022 - Present Unknown        IUGR (intrauterine growth restriction) affecting care of mother, third trimester, other fetus ICD-10-CM: E48.0660  ICD-9-CM: 656.53  2022 - Present Unknown        * (Principal) Hypertension affecting pregnancy ICD-10-CM: O16.9  ICD-9-CM: 642.90  2022 - Present Yes               Lab Results   Component Value Date/Time    ABO/Rh(D) O POSITIVE 2022 07:57 AM      There is no immunization history on file for this patient. * Procedures:   * No surgery found *           * Discharge Condition: [facility] Course: Normal hospital course following the delivery. * Disposition: Home    Discharge Medications:   Current Discharge Medication List          * Follow-up Care/Patient Instructions:   Activity: activity as tolerated and no sex for 6 weeks  Diet: regular diet  Wound Care: keep wound clean and dry    Follow-up Information    None

## 2022-11-25 ENCOUNTER — HOSPITAL ENCOUNTER (EMERGENCY)
Dept: CT IMAGING | Age: 37
Discharge: HOME | DRG: 872 | End: 2022-11-28
Payer: OTHER GOVERNMENT

## 2022-11-25 ENCOUNTER — HOSPITAL ENCOUNTER (EMERGENCY)
Dept: ULTRASOUND IMAGING | Age: 37
Discharge: HOME | DRG: 872 | End: 2022-11-28
Payer: OTHER GOVERNMENT

## 2022-11-25 ENCOUNTER — HOSPITAL ENCOUNTER (INPATIENT)
Age: 37
LOS: 1 days | Discharge: HOME | DRG: 872 | End: 2022-11-26
Attending: EMERGENCY MEDICINE | Admitting: INTERNAL MEDICINE
Payer: OTHER GOVERNMENT

## 2022-11-25 ENCOUNTER — HOSPITAL ENCOUNTER (EMERGENCY)
Dept: GENERAL RADIOLOGY | Age: 37
Discharge: HOME | DRG: 872 | End: 2022-11-28
Payer: OTHER GOVERNMENT

## 2022-11-25 DIAGNOSIS — R74.01 TRANSAMINITIS: ICD-10-CM

## 2022-11-25 DIAGNOSIS — R50.9 FEVER, UNSPECIFIED FEVER CAUSE: Primary | ICD-10-CM

## 2022-11-25 PROBLEM — R82.81 PYURIA: Status: ACTIVE | Noted: 2022-11-25

## 2022-11-25 PROBLEM — A41.9 SEPSIS (HCC): Status: ACTIVE | Noted: 2022-11-25

## 2022-11-25 LAB
ALBUMIN SERPL-MCNC: 3.9 G/DL (ref 3.5–5)
ALBUMIN/GLOB SERPL: 1.1 {RATIO} (ref 0.4–1.6)
ALP SERPL-CCNC: 112 U/L (ref 50–130)
ALT SERPL-CCNC: 144 U/L (ref 12–65)
ANION GAP SERPL CALC-SCNC: 8 MMOL/L (ref 2–11)
APPEARANCE UR: ABNORMAL
AST SERPL-CCNC: 149 U/L (ref 15–37)
B PERT DNA SPEC QL NAA+PROBE: NOT DETECTED
BACTERIA URNS QL MICRO: NEGATIVE /HPF
BASOPHILS # BLD: 0 K/UL (ref 0–0.2)
BASOPHILS NFR BLD: 0 % (ref 0–2)
BILIRUB SERPL-MCNC: 0.6 MG/DL (ref 0.2–1.1)
BILIRUB UR QL: NEGATIVE
BORDETELLA PARAPERTUSSIS BY PCR: NOT DETECTED
BUN SERPL-MCNC: 11 MG/DL (ref 6–23)
C PNEUM DNA SPEC QL NAA+PROBE: NOT DETECTED
CALCIUM SERPL-MCNC: 8.7 MG/DL (ref 8.3–10.4)
CASTS URNS QL MICRO: ABNORMAL /LPF
CHLORIDE SERPL-SCNC: 107 MMOL/L (ref 101–110)
CO2 SERPL-SCNC: 24 MMOL/L (ref 21–32)
COLOR UR: ABNORMAL
CREAT SERPL-MCNC: 0.66 MG/DL (ref 0.6–1)
DIFFERENTIAL METHOD BLD: ABNORMAL
EKG ATRIAL RATE: 131 BPM
EKG DIAGNOSIS: NORMAL
EKG P AXIS: 70 DEGREES
EKG P-R INTERVAL: 132 MS
EKG Q-T INTERVAL: 300 MS
EKG QRS DURATION: 84 MS
EKG QTC CALCULATION (BAZETT): 443 MS
EKG R AXIS: 62 DEGREES
EKG T AXIS: 41 DEGREES
EKG VENTRICULAR RATE: 131 BPM
EOSINOPHIL # BLD: 0.1 K/UL (ref 0–0.8)
EOSINOPHIL NFR BLD: 1 % (ref 0.5–7.8)
EPI CELLS #/AREA URNS HPF: ABNORMAL /HPF
ERYTHROCYTE [DISTWIDTH] IN BLOOD BY AUTOMATED COUNT: 12.9 % (ref 11.9–14.6)
FLUAV AG NPH QL IA: NEGATIVE
FLUAV SUBTYP SPEC NAA+PROBE: NOT DETECTED
FLUBV AG NPH QL IA: NEGATIVE
FLUBV RNA SPEC QL NAA+PROBE: NOT DETECTED
GLOBULIN SER CALC-MCNC: 3.5 G/DL (ref 2.8–4.5)
GLUCOSE SERPL-MCNC: 98 MG/DL (ref 65–100)
GLUCOSE UR STRIP.AUTO-MCNC: NEGATIVE MG/DL
HADV DNA SPEC QL NAA+PROBE: NOT DETECTED
HCG UR QL: NEGATIVE
HCG UR QL: NEGATIVE
HCOV 229E RNA SPEC QL NAA+PROBE: NOT DETECTED
HCOV HKU1 RNA SPEC QL NAA+PROBE: NOT DETECTED
HCOV NL63 RNA SPEC QL NAA+PROBE: NOT DETECTED
HCOV OC43 RNA SPEC QL NAA+PROBE: NOT DETECTED
HCT VFR BLD AUTO: 38.8 % (ref 35.8–46.3)
HGB BLD-MCNC: 12.7 G/DL (ref 11.7–15.4)
HGB UR QL STRIP: ABNORMAL
HMPV RNA SPEC QL NAA+PROBE: NOT DETECTED
HPIV1 RNA SPEC QL NAA+PROBE: NOT DETECTED
HPIV2 RNA SPEC QL NAA+PROBE: NOT DETECTED
HPIV3 RNA SPEC QL NAA+PROBE: NOT DETECTED
HPIV4 RNA SPEC QL NAA+PROBE: NOT DETECTED
IMM GRANULOCYTES # BLD AUTO: 0 K/UL (ref 0–0.5)
IMM GRANULOCYTES NFR BLD AUTO: 0 % (ref 0–5)
KETONES UR QL STRIP.AUTO: NEGATIVE MG/DL
LACTATE SERPL-SCNC: 0.9 MMOL/L (ref 0.4–2)
LACTATE SERPL-SCNC: 0.9 MMOL/L (ref 0.4–2)
LEUKOCYTE ESTERASE UR QL STRIP.AUTO: ABNORMAL
LIPASE SERPL-CCNC: 106 U/L (ref 73–393)
LYMPHOCYTES # BLD: 0.3 K/UL (ref 0.5–4.6)
LYMPHOCYTES NFR BLD: 3 % (ref 13–44)
M PNEUMO DNA SPEC QL NAA+PROBE: NOT DETECTED
MCH RBC QN AUTO: 28.4 PG (ref 26.1–32.9)
MCHC RBC AUTO-ENTMCNC: 32.7 G/DL (ref 31.4–35)
MCV RBC AUTO: 86.8 FL (ref 82–102)
MONOCYTES # BLD: 0.6 K/UL (ref 0.1–1.3)
MONOCYTES NFR BLD: 6 % (ref 4–12)
NEUTS SEG # BLD: 9 K/UL (ref 1.7–8.2)
NEUTS SEG NFR BLD: 90 % (ref 43–78)
NITRITE UR QL STRIP.AUTO: NEGATIVE
NRBC # BLD: 0 K/UL (ref 0–0.2)
PH UR STRIP: 5 [PH] (ref 5–9)
PLATELET # BLD AUTO: 249 K/UL (ref 150–450)
PMV BLD AUTO: 9.1 FL (ref 9.4–12.3)
POTASSIUM SERPL-SCNC: 3.6 MMOL/L (ref 3.5–5.1)
PROCALCITONIN SERPL-MCNC: 0.19 NG/ML (ref 0–0.49)
PROT SERPL-MCNC: 7.4 G/DL (ref 6.3–8.2)
PROT UR STRIP-MCNC: NEGATIVE MG/DL
RBC # BLD AUTO: 4.47 M/UL (ref 4.05–5.2)
RBC #/AREA URNS HPF: >100 /HPF
RSV RNA SPEC QL NAA+PROBE: NOT DETECTED
RV+EV RNA SPEC QL NAA+PROBE: NOT DETECTED
SARS-COV-2 RDRP RESP QL NAA+PROBE: NOT DETECTED
SARS-COV-2 RNA RESP QL NAA+PROBE: NOT DETECTED
SODIUM SERPL-SCNC: 139 MMOL/L (ref 133–143)
SOURCE: NORMAL
SP GR UR REFRACTOMETRY: 1.02 (ref 1–1.02)
SPECIMEN SOURCE: NORMAL
UROBILINOGEN UR QL STRIP.AUTO: 0.2 EU/DL (ref 0.2–1)
WBC # BLD AUTO: 9.9 K/UL (ref 4.3–11.1)
WBC URNS QL MICRO: ABNORMAL /HPF

## 2022-11-25 PROCEDURE — 6360000002 HC RX W HCPCS: Performed by: EMERGENCY MEDICINE

## 2022-11-25 PROCEDURE — 87804 INFLUENZA ASSAY W/OPTIC: CPT

## 2022-11-25 PROCEDURE — 80053 COMPREHEN METABOLIC PANEL: CPT

## 2022-11-25 PROCEDURE — 96365 THER/PROPH/DIAG IV INF INIT: CPT

## 2022-11-25 PROCEDURE — 6370000000 HC RX 637 (ALT 250 FOR IP): Performed by: FAMILY MEDICINE

## 2022-11-25 PROCEDURE — 81025 URINE PREGNANCY TEST: CPT

## 2022-11-25 PROCEDURE — 83690 ASSAY OF LIPASE: CPT

## 2022-11-25 PROCEDURE — 74177 CT ABD & PELVIS W/CONTRAST: CPT

## 2022-11-25 PROCEDURE — 0202U NFCT DS 22 TRGT SARS-COV-2: CPT

## 2022-11-25 PROCEDURE — 80074 ACUTE HEPATITIS PANEL: CPT

## 2022-11-25 PROCEDURE — 83605 ASSAY OF LACTIC ACID: CPT

## 2022-11-25 PROCEDURE — 99285 EMERGENCY DEPT VISIT HI MDM: CPT

## 2022-11-25 PROCEDURE — 96361 HYDRATE IV INFUSION ADD-ON: CPT

## 2022-11-25 PROCEDURE — 2580000003 HC RX 258: Performed by: INTERNAL MEDICINE

## 2022-11-25 PROCEDURE — 1100000000 HC RM PRIVATE

## 2022-11-25 PROCEDURE — 84145 PROCALCITONIN (PCT): CPT

## 2022-11-25 PROCEDURE — 6370000000 HC RX 637 (ALT 250 FOR IP): Performed by: EMERGENCY MEDICINE

## 2022-11-25 PROCEDURE — 2580000003 HC RX 258: Performed by: EMERGENCY MEDICINE

## 2022-11-25 PROCEDURE — 76856 US EXAM PELVIC COMPLETE: CPT

## 2022-11-25 PROCEDURE — 94760 N-INVAS EAR/PLS OXIMETRY 1: CPT

## 2022-11-25 PROCEDURE — 96375 TX/PRO/DX INJ NEW DRUG ADDON: CPT

## 2022-11-25 PROCEDURE — 6370000000 HC RX 637 (ALT 250 FOR IP): Performed by: INTERNAL MEDICINE

## 2022-11-25 PROCEDURE — 96376 TX/PRO/DX INJ SAME DRUG ADON: CPT

## 2022-11-25 PROCEDURE — 71045 X-RAY EXAM CHEST 1 VIEW: CPT

## 2022-11-25 PROCEDURE — 81001 URINALYSIS AUTO W/SCOPE: CPT

## 2022-11-25 PROCEDURE — 87040 BLOOD CULTURE FOR BACTERIA: CPT

## 2022-11-25 PROCEDURE — 96366 THER/PROPH/DIAG IV INF ADDON: CPT

## 2022-11-25 PROCEDURE — 93005 ELECTROCARDIOGRAM TRACING: CPT

## 2022-11-25 PROCEDURE — 87635 SARS-COV-2 COVID-19 AMP PRB: CPT

## 2022-11-25 PROCEDURE — 85025 COMPLETE CBC W/AUTO DIFF WBC: CPT

## 2022-11-25 PROCEDURE — 6360000004 HC RX CONTRAST MEDICATION: Performed by: EMERGENCY MEDICINE

## 2022-11-25 RX ORDER — SODIUM CHLORIDE, SODIUM LACTATE, POTASSIUM CHLORIDE, AND CALCIUM CHLORIDE .6; .31; .03; .02 G/100ML; G/100ML; G/100ML; G/100ML
30 INJECTION, SOLUTION INTRAVENOUS ONCE
Status: COMPLETED | OUTPATIENT
Start: 2022-11-25 | End: 2022-11-25

## 2022-11-25 RX ORDER — SODIUM CHLORIDE, SODIUM LACTATE, POTASSIUM CHLORIDE, AND CALCIUM CHLORIDE .6; .31; .03; .02 G/100ML; G/100ML; G/100ML; G/100ML
1000 INJECTION, SOLUTION INTRAVENOUS
Status: COMPLETED | OUTPATIENT
Start: 2022-11-25 | End: 2022-11-25

## 2022-11-25 RX ORDER — POTASSIUM CHLORIDE 20 MEQ/1
40 TABLET, EXTENDED RELEASE ORAL PRN
Status: DISCONTINUED | OUTPATIENT
Start: 2022-11-25 | End: 2022-11-26 | Stop reason: HOSPADM

## 2022-11-25 RX ORDER — 0.9 % SODIUM CHLORIDE 0.9 %
1000 INTRAVENOUS SOLUTION INTRAVENOUS
Status: COMPLETED | OUTPATIENT
Start: 2022-11-25 | End: 2022-11-25

## 2022-11-25 RX ORDER — ENOXAPARIN SODIUM 100 MG/ML
40 INJECTION SUBCUTANEOUS
Status: DISCONTINUED | OUTPATIENT
Start: 2022-11-26 | End: 2022-11-26 | Stop reason: HOSPADM

## 2022-11-25 RX ORDER — IBUPROFEN 800 MG/1
800 TABLET ORAL
Status: COMPLETED | OUTPATIENT
Start: 2022-11-25 | End: 2022-11-25

## 2022-11-25 RX ORDER — ACETAMINOPHEN 650 MG/1
650 SUPPOSITORY RECTAL EVERY 6 HOURS PRN
Status: DISCONTINUED | OUTPATIENT
Start: 2022-11-25 | End: 2022-11-26 | Stop reason: HOSPADM

## 2022-11-25 RX ORDER — ONDANSETRON 2 MG/ML
4 INJECTION INTRAMUSCULAR; INTRAVENOUS EVERY 6 HOURS PRN
Status: DISCONTINUED | OUTPATIENT
Start: 2022-11-25 | End: 2022-11-26 | Stop reason: HOSPADM

## 2022-11-25 RX ORDER — MAGNESIUM SULFATE IN WATER 40 MG/ML
2000 INJECTION, SOLUTION INTRAVENOUS PRN
Status: DISCONTINUED | OUTPATIENT
Start: 2022-11-25 | End: 2022-11-26 | Stop reason: HOSPADM

## 2022-11-25 RX ORDER — MAGNESIUM HYDROXIDE/ALUMINUM HYDROXICE/SIMETHICONE 120; 1200; 1200 MG/30ML; MG/30ML; MG/30ML
30 SUSPENSION ORAL EVERY 6 HOURS PRN
Status: DISCONTINUED | OUTPATIENT
Start: 2022-11-25 | End: 2022-11-26 | Stop reason: HOSPADM

## 2022-11-25 RX ORDER — SODIUM CHLORIDE 9 MG/ML
INJECTION, SOLUTION INTRAVENOUS CONTINUOUS
Status: DISCONTINUED | OUTPATIENT
Start: 2022-11-25 | End: 2022-11-26 | Stop reason: HOSPADM

## 2022-11-25 RX ORDER — ACETAMINOPHEN 500 MG
1000 TABLET ORAL
Status: COMPLETED | OUTPATIENT
Start: 2022-11-25 | End: 2022-11-25

## 2022-11-25 RX ORDER — 0.9 % SODIUM CHLORIDE 0.9 %
100 INTRAVENOUS SOLUTION INTRAVENOUS ONCE
Status: COMPLETED | OUTPATIENT
Start: 2022-11-25 | End: 2022-11-25

## 2022-11-25 RX ORDER — ACETAMINOPHEN 325 MG/1
650 TABLET ORAL EVERY 6 HOURS PRN
Status: DISCONTINUED | OUTPATIENT
Start: 2022-11-25 | End: 2022-11-26 | Stop reason: HOSPADM

## 2022-11-25 RX ORDER — SODIUM CHLORIDE 0.9 % (FLUSH) 0.9 %
5-40 SYRINGE (ML) INJECTION 2 TIMES DAILY
Status: DISCONTINUED | OUTPATIENT
Start: 2022-11-25 | End: 2022-11-29 | Stop reason: HOSPADM

## 2022-11-25 RX ORDER — POTASSIUM CHLORIDE 7.45 MG/ML
10 INJECTION INTRAVENOUS PRN
Status: DISCONTINUED | OUTPATIENT
Start: 2022-11-25 | End: 2022-11-26 | Stop reason: HOSPADM

## 2022-11-25 RX ORDER — ONDANSETRON 2 MG/ML
4 INJECTION INTRAMUSCULAR; INTRAVENOUS
Status: COMPLETED | OUTPATIENT
Start: 2022-11-25 | End: 2022-11-25

## 2022-11-25 RX ORDER — SODIUM CHLORIDE 0.9 % (FLUSH) 0.9 %
5-40 SYRINGE (ML) INJECTION EVERY 12 HOURS SCHEDULED
Status: DISCONTINUED | OUTPATIENT
Start: 2022-11-25 | End: 2022-11-26 | Stop reason: HOSPADM

## 2022-11-25 RX ORDER — 0.9 % SODIUM CHLORIDE 0.9 %
500 INTRAVENOUS SOLUTION INTRAVENOUS ONCE
Status: DISCONTINUED | OUTPATIENT
Start: 2022-11-25 | End: 2022-11-26 | Stop reason: HOSPADM

## 2022-11-25 RX ORDER — ESOMEPRAZOLE MAGNESIUM 20 MG/1
20 FOR SUSPENSION ORAL 2 TIMES DAILY
COMMUNITY

## 2022-11-25 RX ORDER — ACETAMINOPHEN 500 MG
1000 TABLET ORAL
Status: DISCONTINUED | OUTPATIENT
Start: 2022-11-25 | End: 2022-11-25

## 2022-11-25 RX ORDER — BISACODYL 10 MG
10 SUPPOSITORY, RECTAL RECTAL DAILY PRN
Status: DISCONTINUED | OUTPATIENT
Start: 2022-11-25 | End: 2022-11-26 | Stop reason: HOSPADM

## 2022-11-25 RX ORDER — POLYETHYLENE GLYCOL 3350 17 G/17G
17 POWDER, FOR SOLUTION ORAL DAILY PRN
Status: DISCONTINUED | OUTPATIENT
Start: 2022-11-25 | End: 2022-11-26 | Stop reason: HOSPADM

## 2022-11-25 RX ORDER — ONDANSETRON 4 MG/1
4 TABLET, ORALLY DISINTEGRATING ORAL EVERY 8 HOURS PRN
Status: DISCONTINUED | OUTPATIENT
Start: 2022-11-25 | End: 2022-11-26 | Stop reason: HOSPADM

## 2022-11-25 RX ORDER — SODIUM CHLORIDE 0.9 % (FLUSH) 0.9 %
5-40 SYRINGE (ML) INJECTION PRN
Status: DISCONTINUED | OUTPATIENT
Start: 2022-11-25 | End: 2022-11-26 | Stop reason: HOSPADM

## 2022-11-25 RX ORDER — FAMOTIDINE 20 MG/1
10 TABLET, FILM COATED ORAL DAILY PRN
Status: DISCONTINUED | OUTPATIENT
Start: 2022-11-25 | End: 2022-11-26 | Stop reason: HOSPADM

## 2022-11-25 RX ORDER — SODIUM CHLORIDE 9 MG/ML
INJECTION, SOLUTION INTRAVENOUS PRN
Status: DISCONTINUED | OUTPATIENT
Start: 2022-11-25 | End: 2022-11-26 | Stop reason: HOSPADM

## 2022-11-25 RX ADMIN — CEFTRIAXONE SODIUM 1000 MG: 1 INJECTION, POWDER, FOR SOLUTION INTRAMUSCULAR; INTRAVENOUS at 11:53

## 2022-11-25 RX ADMIN — SODIUM CHLORIDE, PRESERVATIVE FREE 10 ML: 5 INJECTION INTRAVENOUS at 12:57

## 2022-11-25 RX ADMIN — ACETAMINOPHEN 650 MG: 325 TABLET, FILM COATED ORAL at 23:18

## 2022-11-25 RX ADMIN — MAGNESIUM HYDROXIDE/ALUMINUM HYDROXICE/SIMETHICONE 30 ML: 120; 1200; 1200 SUSPENSION ORAL at 19:50

## 2022-11-25 RX ADMIN — SODIUM CHLORIDE, POTASSIUM CHLORIDE, SODIUM LACTATE AND CALCIUM CHLORIDE 1641 ML: 600; 310; 30; 20 INJECTION, SOLUTION INTRAVENOUS at 10:55

## 2022-11-25 RX ADMIN — SODIUM CHLORIDE, POTASSIUM CHLORIDE, SODIUM LACTATE AND CALCIUM CHLORIDE 1000 ML: 600; 310; 30; 20 INJECTION, SOLUTION INTRAVENOUS at 17:13

## 2022-11-25 RX ADMIN — ONDANSETRON 4 MG: 4 TABLET, ORALLY DISINTEGRATING ORAL at 22:42

## 2022-11-25 RX ADMIN — SODIUM CHLORIDE 1000 ML: 9 INJECTION, SOLUTION INTRAVENOUS at 15:33

## 2022-11-25 RX ADMIN — IBUPROFEN 800 MG: 800 TABLET, FILM COATED ORAL at 11:53

## 2022-11-25 RX ADMIN — ONDANSETRON 4 MG: 2 INJECTION INTRAMUSCULAR; INTRAVENOUS at 10:51

## 2022-11-25 RX ADMIN — SODIUM CHLORIDE 100 ML: 9 INJECTION, SOLUTION INTRAVENOUS at 12:57

## 2022-11-25 RX ADMIN — ONDANSETRON 4 MG: 2 INJECTION INTRAMUSCULAR; INTRAVENOUS at 13:42

## 2022-11-25 RX ADMIN — ACETAMINOPHEN 1000 MG: 500 TABLET, FILM COATED ORAL at 15:29

## 2022-11-25 RX ADMIN — SODIUM CHLORIDE: 9 INJECTION, SOLUTION INTRAVENOUS at 18:10

## 2022-11-25 RX ADMIN — MICONAZOLE NITRATE 1 APPLICATOR: 20 CREAM VAGINAL at 22:15

## 2022-11-25 RX ADMIN — IOPAMIDOL 100 ML: 755 INJECTION, SOLUTION INTRAVENOUS at 12:57

## 2022-11-25 ASSESSMENT — ENCOUNTER SYMPTOMS
DIARRHEA: 0
WHEEZING: 0
CONSTIPATION: 0
ABDOMINAL PAIN: 0
RHINORRHEA: 0
SHORTNESS OF BREATH: 0
NAUSEA: 1
BACK PAIN: 0
SORE THROAT: 0
BLOOD IN STOOL: 0
EYE REDNESS: 0
VOMITING: 1
COUGH: 0

## 2022-11-25 ASSESSMENT — PAIN DESCRIPTION - FREQUENCY: FREQUENCY: CONTINUOUS

## 2022-11-25 ASSESSMENT — PAIN SCALES - GENERAL
PAINLEVEL_OUTOF10: 3
PAINLEVEL_OUTOF10: 5
PAINLEVEL_OUTOF10: 6

## 2022-11-25 ASSESSMENT — PAIN - FUNCTIONAL ASSESSMENT
PAIN_FUNCTIONAL_ASSESSMENT: 0-10
PAIN_FUNCTIONAL_ASSESSMENT: ACTIVITIES ARE NOT PREVENTED

## 2022-11-25 ASSESSMENT — PAIN DESCRIPTION - ONSET: ONSET: SUDDEN

## 2022-11-25 ASSESSMENT — PAIN DESCRIPTION - LOCATION
LOCATION: GENERALIZED
LOCATION: GENERALIZED;BACK
LOCATION: HEAD;FLANK;BACK

## 2022-11-25 ASSESSMENT — PAIN DESCRIPTION - PAIN TYPE: TYPE: ACUTE PAIN

## 2022-11-25 ASSESSMENT — PAIN DESCRIPTION - ORIENTATION: ORIENTATION: RIGHT;LEFT

## 2022-11-25 ASSESSMENT — PAIN DESCRIPTION - DESCRIPTORS
DESCRIPTORS: ACHING
DESCRIPTORS: ACHING

## 2022-11-25 NOTE — ED NOTES
Pt bp 94/ 55, hr 117, zofran given for nausea , 1 liter ns started      Mai Winter, RN  11/25/22 4215

## 2022-11-25 NOTE — ED TRIAGE NOTES
Complaints of Fever, nausea and Chills, generalized body aches since this am. Complaints of mild dizziness as well

## 2022-11-25 NOTE — ED NOTES
To 366 via transport, 3rd floor notified pt is being transported      Leobardo Calderon RN  11/25/22 7732

## 2022-11-25 NOTE — ED PROVIDER NOTES
Emergency Department Provider Note                   PCP:                Pineda Bradshaw MD               Age: 30 y.o. Sex: female       ICD-10-CM    1. Fever, unspecified fever cause  R50.9       2. Transaminitis  R74.01           DISPOSITION          MDM  Number of Diagnoses or Management Options  Fever, unspecified fever cause: new, needed workup  Transaminitis: new, needed workup  Diagnosis management comments: Patient given Motrin, 30 cc/kg LR IV fluid bolus, Zofran. UA with evidence of UTI. Rocephin 1 g IV given. ALT elevated at 144, . Lipase added on. No leukocytosis. Chest x-ray clear. CT abdomen and pelvis with right ovarian vein prominence, nonspecific. Otherwise unremarkable CT. Patient remains tachycardic in the 120s. Patient remains hypotensive. Rapid COVID, flu negative. Pelvic ultrasound ordered. Will consult hospitalist for admission. Amount and/or Complexity of Data Reviewed  Clinical lab tests: ordered and reviewed  Tests in the radiology section of CPT®: ordered and reviewed  Tests in the medicine section of CPT®: ordered and reviewed  Review and summarize past medical records: yes  Independent visualization of images, tracings, or specimens: yes    Risk of Complications, Morbidity, and/or Mortality  Presenting problems: high  Diagnostic procedures: high  Management options: high    Patient Progress  Patient progress: stable       ED Course as of 11/25/22 1350   Fri Nov 25, 2022   1110 Portable Chest X-ray FINDINGS:      Support Lines and Tubes: None     Cardiac Silhouette: Within normal limits in size. Lungs: No focal airspace disease. Pleura: No pleural effusion. No pneumothorax. Osseous Structures: Unremarkable. Upper Abdomen: Unremarkable. IMPRESSION:   No evidence of acute cardiopulmonary disease.  [DF]   7469 ALT(!): 144 [DF]   1132 AST(!): 149 [DF]   1143 WBC, UA(!): 20-50 [DF]   1143 Leukocyte Esterase, Urine(!): MODERATE [DF] 1345 CT abdomen/pelvis IMPRESSION:  Right ovarian vein is prominent, nonspecific. Otherwise unremarkable CT abdomen and pelvis. [DF]      ED Course User Index  [DF] Manuel Cadet MD        Orders Placed This Encounter   Procedures    Blood Culture 1    Blood Culture 2    COVID-19, Rapid    Rapid influenza A/B antigens    XR CHEST PORTABLE    CT ABDOMEN PELVIS W IV CONTRAST Additional Contrast? None    US PELVIS COMPLETE    Comprehensive Metabolic Panel    CBC with Auto Differential    Lactate, Sepsis    Procalcitonin    Pregnancy, Urine    Urinalysis w rflx microscopic    Lipase    Neurologic Status Assessment    Strict intake and output    Vital Signs Per Unit Routine    POCT Urine Dipstick    POC Pregnancy Urine Qual    EKG 12 Lead    Saline lock IV        Medications   0.9 % sodium chloride bolus (has no administration in time range)   lactated ringers bolus (has no administration in time range)   lactated ringers bolus (1,641 mLs IntraVENous New Bag 11/25/22 1055)   ondansetron (ZOFRAN) injection 4 mg (4 mg IntraVENous Given 11/25/22 1051)   ibuprofen (ADVIL;MOTRIN) tablet 800 mg (800 mg Oral Given 11/25/22 1153)   cefTRIAXone (ROCEPHIN) 1,000 mg in sodium chloride 0.9 % 50 mL IVPB mini-bag (1,000 mg IntraVENous New Bag 11/25/22 1153)   ondansetron (ZOFRAN) injection 4 mg (4 mg IntraVENous Given 11/25/22 1342)       New Prescriptions    No medications on file        Karen Turner is a 30 y.o. female who presents to the Emergency Department with chief complaint of fever, myalgias. Chief Complaint   Patient presents with    Nausea      42-year-old female with history of iron deficiency anemia presents with complaint of fever, chills, myalgias, dizziness with standing that is worsened since yesterday. Denies any recent sick contacts. Denies cough, shortness of breath, chest pain, abdominal pain, diarrhea. Patient denies any significant dysuria, hematuria.   Denies vaginal bleeding, vaginal VIT [id number] (PNV PO)    Take 1 tablet by mouth daily        Vitals signs and nursing note reviewed. Patient Vitals for the past 4 hrs:   Temp Pulse Resp BP SpO2   11/25/22 1342 -- (!) 119 (!) 48 (!) 94/55 98 %   11/25/22 1330 -- (!) 120 14 -- 96 %   11/25/22 1320 -- (!) 121 12 -- 97 %   11/25/22 1308 -- (!) 123 -- (!) 118/58 99 %   11/25/22 1213 -- (!) 115 16 111/62 99 %   11/25/22 1152 -- -- -- 112/64 100 %   11/25/22 1143 99.3 °F (37.4 °C) (!) 115 19 -- 96 %   11/25/22 1140 -- (!) 117 15 -- 98 %   11/25/22 1117 -- (!) 113 22 -- 97 %   11/25/22 1102 -- (!) 118 (!) 31 -- 97 %   11/25/22 1047 -- (!) 126 (!) 42 -- 96 %   11/25/22 1012 (!) 100.7 °F (38.2 °C) (!) 141 16 98/65 98 %          Physical Exam  Vitals and nursing note reviewed. Constitutional:       Appearance: She is ill-appearing. HENT:      Head: Normocephalic. Right Ear: Tympanic membrane and ear canal normal.      Left Ear: Tympanic membrane and ear canal normal.      Nose: Nose normal. No congestion or rhinorrhea. Mouth/Throat:      Mouth: Mucous membranes are moist.      Pharynx: No oropharyngeal exudate or posterior oropharyngeal erythema. Eyes:      Extraocular Movements: Extraocular movements intact. Conjunctiva/sclera: Conjunctivae normal.      Pupils: Pupils are equal, round, and reactive to light. Cardiovascular:      Rate and Rhythm: Tachycardia present. Pulses: Normal pulses. Heart sounds: Normal heart sounds. Pulmonary:      Effort: Pulmonary effort is normal.      Breath sounds: Normal breath sounds. Abdominal:      General: Bowel sounds are normal.      Palpations: Abdomen is soft. Tenderness: There is no abdominal tenderness. There is no right CVA tenderness, left CVA tenderness or guarding. Comments: Soft, nontender, nondistended. No rebound or guarding. No CVA tenderness. Musculoskeletal:         General: Normal range of motion. Cervical back: Normal range of motion.  No rigidity. Comments: No calf tenderness. No palpable cords. Full range of motion of all extremities. Skin:     Findings: No erythema or rash. Neurological:      General: No focal deficit present. Mental Status: She is alert and oriented to person, place, and time. Cranial Nerves: No cranial nerve deficit. Sensory: No sensory deficit. Motor: No weakness. Comments: No focal deficits. No meningismus. EKG 12 Lead    Date/Time: 11/25/2022 10:44 AM  Performed by: Nacho Villafana MD  Authorized by: Nacho Villafana MD     ECG reviewed by ED Physician in the absence of a cardiologist: yes    Rate:     ECG rate:  131    ECG rate assessment: tachycardic    Rhythm:     Rhythm: sinus tachycardia    Ectopy:     Ectopy: none    QRS:     QRS axis:  Normal    QRS intervals:  Normal    QRS conduction: normal    ST segments:     ST segments:  Normal  T waves:     T waves: normal      Results for orders placed or performed during the hospital encounter of 11/25/22   COVID-19, Rapid    Specimen: Nasopharyngeal   Result Value Ref Range    Source Nasopharyngeal      SARS-CoV-2, Rapid Not detected NOTD     Rapid influenza A/B antigens    Specimen: Nasal Washing   Result Value Ref Range    Influenza A Ag Negative NEG      Influenza B Ag Negative NEG      Source Nasopharyngeal     XR CHEST PORTABLE    Narrative    EXAMINATION: CHEST RADIOGRAPH 11/25/2022 10:55 AM    ACCESSION NUMBER: [id number]    INDICATION: Sepsis    COMPARISON: None available    TECHNIQUE: A single view of the chest was obtained. FINDINGS:     Support Lines and Tubes: None    Cardiac Silhouette: Within normal limits in size. Lungs: No focal airspace disease. Pleura: No pleural effusion. No pneumothorax. Osseous Structures: Unremarkable. Upper Abdomen: Unremarkable. Impression    No evidence of acute cardiopulmonary disease.      CT ABDOMEN PELVIS W IV CONTRAST Additional Contrast? None Narrative    CT ABDOMEN AND PELVIS WITH CONTRAST. INDICATION: Fever, nausea and chills, generalized body aches. COMPARISON: None    TECHNIQUE: 5 mm axial scans from above the diaphragms to the pubic symphysis  following oral and 100 cc intravenous contrast without acute complication. Intravenous contrast was given to increase the sensitivity to acute  inflammation. Radiation dose reduction techniques were used for this study. Our CT scanners use one or more of the following: Automated exposure control,  adjustment of the mA and or kV according to patient size, iterative  reconstruction. FINDINGS:   -Lung Bases: The lung bases are clear.    -Liver: Appears uniform. -Gallbladder: No gallstones identified  -Bile Ducts: Not dilated.    -Pancreas: Unremarkable.  -Spleen: Uniform and normal size.    -Stomach: Unremarkable. -Bowel: Normal caliber. No inflammatory changes. -Adrenals: Are normal size.  -Kidneys/Ureters: Enhance symmetrically. No hydronephrosis. -Urinary Bladder: Unremarkable. -Reproductive Organs: Uterus and adnexa unremarkable.    -Lymph Nodes: No grossly enlarged retroperitoneal, mesenteric, or pelvic  adenopathy.  -Vasculature: Aorta is normal caliber. Right ovarian vein is prominent.  -Bones: No gross bony lesions.    -Other: No ascites. Impression    Right ovarian vein is prominent, nonspecific. Otherwise  unremarkable CT abdomen and pelvis.    Comprehensive Metabolic Panel   Result Value Ref Range    Sodium 139 133 - 143 mmol/L    Potassium 3.6 3.5 - 5.1 mmol/L    Chloride 107 101 - 110 mmol/L    CO2 24 21 - 32 mmol/L    Anion Gap 8 2 - 11 mmol/L    Glucose 98 65 - 100 mg/dL    BUN 11 6 - 23 MG/DL    Creatinine 0.66 0.6 - 1.0 MG/DL    Est, Glom Filt Rate >60 >60 ml/min/1.73m2    Calcium 8.7 8.3 - 10.4 MG/DL    Total Bilirubin 0.6 0.2 - 1.1 MG/DL     (H) 12 - 65 U/L     (H) 15 - 37 U/L    Alk Phosphatase 112 50 - 130 U/L    Total Protein 7.4 6.3 - 8.2 g/dL Albumin 3.9 3.5 - 5.0 g/dL    Globulin 3.5 2.8 - 4.5 g/dL    Albumin/Globulin Ratio 1.1 0.4 - 1.6     CBC with Auto Differential   Result Value Ref Range    WBC 9.9 4.3 - 11.1 K/uL    RBC 4.47 4.05 - 5.2 M/uL    Hemoglobin 12.7 11.7 - 15.4 g/dL    Hematocrit 38.8 35.8 - 46.3 %    MCV 86.8 82.0 - 102.0 FL    MCH 28.4 26.1 - 32.9 PG    MCHC 32.7 31.4 - 35.0 g/dL    RDW 12.9 11.9 - 14.6 %    Platelets 044 345 - 638 K/uL    MPV 9.1 (L) 9.4 - 12.3 FL    nRBC 0.00 0.0 - 0.2 K/uL    Differential Type AUTOMATED      Seg Neutrophils 90 (H) 43 - 78 %    Lymphocytes 3 (L) 13 - 44 %    Monocytes 6 4.0 - 12.0 %    Eosinophils % 1 0.5 - 7.8 %    Basophils 0 0.0 - 2.0 %    Immature Granulocytes 0 0.0 - 5.0 %    Segs Absolute 9.0 (H) 1.7 - 8.2 K/UL    Absolute Lymph # 0.3 (L) 0.5 - 4.6 K/UL    Absolute Mono # 0.6 0.1 - 1.3 K/UL    Absolute Eos # 0.1 0.0 - 0.8 K/UL    Basophils Absolute 0.0 0.0 - 0.2 K/UL    Absolute Immature Granulocyte 0.0 0.0 - 0.5 K/UL   Lactate, Sepsis   Result Value Ref Range    Lactic Acid, Sepsis 0.9 0.4 - 2.0 MMOL/L   Lactate, Sepsis   Result Value Ref Range    Lactic Acid, Sepsis 0.9 0.4 - 2.0 MMOL/L   Procalcitonin   Result Value Ref Range    Procalcitonin 0.19 0.00 - 0.49 ng/mL   Pregnancy, Urine   Result Value Ref Range    HCG(Urine) Pregnancy Test Negative NEG     Urinalysis w rflx microscopic   Result Value Ref Range    Color, UA YELLOW/STRAW      Appearance CLOUDY      Specific Gravity, UA 1.017 1.001 - 1.023      pH, Urine 5.0 5.0 - 9.0      Protein, UA Negative NEG mg/dL    Glucose, UA Negative mg/dL    Ketones, Urine Negative NEG mg/dL    Bilirubin Urine Negative NEG      Blood, Urine LARGE (A) NEG      Urobilinogen, Urine 0.2 0.2 - 1.0 EU/dL    Nitrite, Urine Negative NEG      Leukocyte Esterase, Urine MODERATE (A) NEG      WBC, UA 20-50 (A) U4 /hpf    RBC, UA >100 (A) U5 /hpf    Epithelial Cells UA 0-5 U5 /hpf    BACTERIA, URINE Negative NEG /hpf    Casts 0-2 U2 /lpf   Lipase   Result Value Ref Range    Lipase 106 73 - 393 U/L   POC Pregnancy Urine Qual   Result Value Ref Range    Preg Test, Ur Negative NEG     EKG 12 Lead   Result Value Ref Range    Ventricular Rate 131 BPM    Atrial Rate 131 BPM    P-R Interval 132 ms    QRS Duration 84 ms    Q-T Interval 300 ms    QTc Calculation (Bazett) 443 ms    P Axis 70 degrees    R Axis 62 degrees    T Axis 41 degrees    Diagnosis Sinus tachycardia         CT ABDOMEN PELVIS W IV CONTRAST Additional Contrast? None   Final Result   Right ovarian vein is prominent, nonspecific. Otherwise   unremarkable CT abdomen and pelvis. XR CHEST PORTABLE   Final Result      No evidence of acute cardiopulmonary disease. US PELVIS COMPLETE    (Results Pending)                       Voice dictation software was used during the making of this note. This software is not perfect and grammatical and other typographical errors may be present. This note has not been completely proofread for errors.      Patrick Mendoza MD  11/25/22 1420

## 2022-11-25 NOTE — H&P
Hospitalist History and Physical   Admit Date:  2022 10:19 AM   Name:  Roxana Trujillo   Age:  30 y.o. Sex:  female  :  1985   MRN:  [id number]   Room:  [facility]/    Presenting Complaint: Nausea     Reason(s) for Admission: Sepsis ([facility].) [A41.9]     History of Present Illness:   Roxana Trujillo is a 30 y.o. female with medical history of no significant past medical history who presented with fever, chills, malaise, dizziness standing which has worsened since yesterday. Patient denies any sick contacts. No other family members were ill. Patient reports that she had spider bite in her lower extremities about a week ago which is now resolved. Patient denies chest pain, shortness of breath, abdominal pain. She has not been able to tolerate anything today. Complaining of headache at this time. In the emergency room, patient was noted to be febrile to 100.7 °F, tachycardic and tachypneic. Laboratory work-up is unremarkable except for slight elevation of ALT to 144 and . CT abdomen pelvis shows non specific finding of prominent right ovarian vein. Urinalysis with moderate leukocyte esterase, 2-50 WBC, more than 100 RBCs. Flu and COVID is negative. EKG with sinus tachycardai    Review of Systems: 10 systems reviewed and negative except as noted in HPI. Assessment & Plan:     Sepsis, present on admission, of unknown etiology  Meets criteria sepsis with fever, tachycardia and tachypnea. Urinalysis with WBC and leukocyte esterase  -start on ceftriaxone  -follow-up blood culture and urine culture  -IV fluid  -We will check respiratory viral panel      Diet: No diet orders on file  VTE ppx: lovenox  Code status: Prior    Hospital Problems:  Principal Problem:    Sepsis ([facility]  Active Problems:    Pyuria  Resolved Problems:    * No resolved hospital problems.  *       Past History:     Past Medical History:   Diagnosis Date    Anemia        Past Surgical History:   Procedure Laterality Date    JAY TOOTH EXTRACTION          Social History     Tobacco Use    Smoking status: Never    Smokeless tobacco: Never   Substance Use Topics    Alcohol use: Not Currently      Social History     Substance and Sexual Activity   Drug Use Never     Family History: No family history of DM or HTN per patient. Immunization History   Administered Date(s) Administered    Tdap (Boostrix, Adacel) 12/14/2007, 10/31/2016, 03/19/2018, 03/15/2021     Allergies   Allergen Reactions    Amoxicillin Hives     Pt sts had rash last time she had amoxicillin for strep     Prior to Admit Medications:  Current Outpatient Medications   Medication Instructions    ibuprofen (ADVIL;MOTRIN) 800 mg, Oral, EVERY 6 HOURS    Prenatal Vit w/Gf-Iakvqdodp-RA (PNV PO) 1 tablet, DAILY         Objective:   Patient Vitals for the past 24 hrs:   Temp Pulse Resp BP SpO2   11/25/22 1514 -- (!) 128 -- 103/71 100 %   11/25/22 1414 -- (!) 118 20 -- 99 %   11/25/22 1410 -- (!) 124 24 -- 99 %   11/25/22 1400 -- (!) 121 15 -- 99 %   11/25/22 1342 -- (!) 119 (!) 48 (!) 94/55 98 %   11/25/22 1330 -- (!) 120 14 -- 96 %   11/25/22 1320 -- (!) 121 12 -- 97 %   11/25/22 1308 -- (!) 123 -- (!) 118/58 99 %   11/25/22 1213 -- (!) 115 16 111/62 99 %   11/25/22 1152 -- -- -- 112/64 100 %   11/25/22 1143 99.3 °F (37.4 °C) (!) 115 19 -- 96 %   11/25/22 1140 -- (!) 117 15 -- 98 %   11/25/22 1117 -- (!) 113 22 -- 97 %   11/25/22 1102 -- (!) 118 (!) 31 -- 97 %   11/25/22 1047 -- (!) 126 (!) 42 -- 96 %   11/25/22 1012 (!) 100.7 °F (38.2 °C) (!) 141 16 98/65 98 %       Oxygen Therapy  SpO2: 100 %  Pulse Oximeter Device Mode: Continuous  O2 Device: None (Room air)    Estimated body mass index is 27.46 kg/m² as calculated from the following:    Height as of this encounter: 5' 4\" (1.626 m). Weight as of this encounter: 160 lb (72.6 kg).   No intake or output data in the 24 hours ending 11/25/22 1535      Physical Exam:    Blood pressure 103/71, pulse (!) 128, temperature 99.3 °F (37.4 °C), temperature source Oral, resp. rate 20, height 5' 4\" (1.626 m), weight 160 lb (72.6 kg), SpO2 100 %, unknown if currently breastfeeding. General:    Well nourished. Ill appearing,   Head:  Normocephalic, atraumatic  Eyes:  Sclerae appear normal.  Pupils equally round. ENT:  Nares appear normal, no drainage. Moist oral mucosa  Neck:  No restricted ROM. Trachea midline   CV:   Regular, tachycardic. No m/r/g. No jugular venous distension. Lungs:   CTAB. No wheezing. Symmetric expansion. Abdomen: Bowel sounds present. Soft, nontender, nondistended. Extremities: No cyanosis or clubbing. No edema  Skin:     No rashes and normal coloration. Warm and dry. Neuro:  CN II-XII grossly intact. A&Ox3  Psych:  Normal mood and affect.       I have personally reviewed labs and tests showing:  Recent Labs:  Recent Results (from the past 24 hour(s))   EKG 12 Lead    Collection Time: 11/25/22 10:34 AM   Result Value Ref Range    Ventricular Rate 131 BPM    Atrial Rate 131 BPM    P-R Interval 132 ms    QRS Duration 84 ms    Q-T Interval 300 ms    QTc Calculation (Bazett) 443 ms    P Axis 70 degrees    R Axis 62 degrees    T Axis 41 degrees    Diagnosis Sinus tachycardia    Comprehensive Metabolic Panel    Collection Time: 11/25/22 10:46 AM   Result Value Ref Range    Sodium 139 133 - 143 mmol/L    Potassium 3.6 3.5 - 5.1 mmol/L    Chloride 107 101 - 110 mmol/L    CO2 24 21 - 32 mmol/L    Anion Gap 8 2 - 11 mmol/L    Glucose 98 65 - 100 mg/dL    BUN 11 6 - 23 MG/DL    Creatinine 0.66 0.6 - 1.0 MG/DL    Est, Glom Filt Rate >60 >60 ml/min/1.73m2    Calcium 8.7 8.3 - 10.4 MG/DL    Total Bilirubin 0.6 0.2 - 1.1 MG/DL     (H) 12 - 65 U/L     (H) 15 - 37 U/L    Alk Phosphatase 112 50 - 130 U/L    Total Protein 7.4 6.3 - 8.2 g/dL    Albumin 3.9 3.5 - 5.0 g/dL    Globulin 3.5 2.8 - 4.5 g/dL    Albumin/Globulin Ratio 1.1 0.4 - 1.6     CBC with Auto Differential    Collection Time: 11/25/22 10:46 AM Result Value Ref Range    WBC 9.9 4.3 - 11.1 K/uL    RBC 4.47 4.05 - 5.2 M/uL    Hemoglobin 12.7 11.7 - 15.4 g/dL    Hematocrit 38.8 35.8 - 46.3 %    MCV 86.8 82.0 - 102.0 FL    MCH 28.4 26.1 - 32.9 PG    MCHC 32.7 31.4 - 35.0 g/dL    RDW 12.9 11.9 - 14.6 %    Platelets 944 860 - 018 K/uL    MPV 9.1 (L) 9.4 - 12.3 FL    nRBC 0.00 0.0 - 0.2 K/uL    Differential Type AUTOMATED      Seg Neutrophils 90 (H) 43 - 78 %    Lymphocytes 3 (L) 13 - 44 %    Monocytes 6 4.0 - 12.0 %    Eosinophils % 1 0.5 - 7.8 %    Basophils 0 0.0 - 2.0 %    Immature Granulocytes 0 0.0 - 5.0 %    Segs Absolute 9.0 (H) 1.7 - 8.2 K/UL    Absolute Lymph # 0.3 (L) 0.5 - 4.6 K/UL    Absolute Mono # 0.6 0.1 - 1.3 K/UL    Absolute Eos # 0.1 0.0 - 0.8 K/UL    Basophils Absolute 0.0 0.0 - 0.2 K/UL    Absolute Immature Granulocyte 0.0 0.0 - 0.5 K/UL   Lactate, Sepsis    Collection Time: 11/25/22 10:46 AM   Result Value Ref Range    Lactic Acid, Sepsis 0.9 0.4 - 2.0 MMOL/L   Procalcitonin    Collection Time: 11/25/22 10:46 AM   Result Value Ref Range    Procalcitonin 0.19 0.00 - 0.49 ng/mL   COVID-19, Rapid    Collection Time: 11/25/22 10:46 AM    Specimen: Nasopharyngeal   Result Value Ref Range    Source Nasopharyngeal      SARS-CoV-2, Rapid Not detected NOTD     Rapid influenza A/B antigens    Collection Time: 11/25/22 10:46 AM    Specimen: Nasal Washing   Result Value Ref Range    Influenza A Ag Negative NEG      Influenza B Ag Negative NEG      Source Nasopharyngeal     Pregnancy, Urine    Collection Time: 11/25/22 10:46 AM   Result Value Ref Range    HCG(Urine) Pregnancy Test Negative NEG     Lipase    Collection Time: 11/25/22 10:46 AM   Result Value Ref Range    Lipase 106 73 - 393 U/L   POC Pregnancy Urine Qual    Collection Time: 11/25/22 11:05 AM   Result Value Ref Range    Preg Test, Ur Negative NEG     Urinalysis w rflx microscopic    Collection Time: 11/25/22 11:07 AM   Result Value Ref Range    Color, UA YELLOW/STRAW      Appearance CLOUDY      Specific Gravity, UA 1.017 1.001 - 1.023      pH, Urine 5.0 5.0 - 9.0      Protein, UA Negative NEG mg/dL    Glucose, UA Negative mg/dL    Ketones, Urine Negative NEG mg/dL    Bilirubin Urine Negative NEG      Blood, Urine LARGE (A) NEG      Urobilinogen, Urine 0.2 0.2 - 1.0 EU/dL    Nitrite, Urine Negative NEG      Leukocyte Esterase, Urine MODERATE (A) NEG      WBC, UA 20-50 (A) U4 /hpf    RBC, UA >100 (A) U5 /hpf    Epithelial Cells UA 0-5 U5 /hpf    BACTERIA, URINE Negative NEG /hpf    Casts 0-2 U2 /lpf   Lactate, Sepsis    Collection Time: 11/25/22 12:40 PM   Result Value Ref Range    Lactic Acid, Sepsis 0.9 0.4 - 2.0 MMOL/L       I have personally reviewed imaging studies showing:  CT ABDOMEN PELVIS W IV CONTRAST Additional Contrast? None    Result Date: 11/25/2022  CT ABDOMEN AND PELVIS WITH CONTRAST. INDICATION: Fever, nausea and chills, generalized body aches. COMPARISON: None TECHNIQUE: 5 mm axial scans from above the diaphragms to the pubic symphysis following oral and 100 cc intravenous contrast without acute complication. Intravenous contrast was given to increase the sensitivity to acute inflammation. Radiation dose reduction techniques were used for this study. Our CT scanners use one or more of the following: Automated exposure control, adjustment of the mA and or kV according to patient size, iterative reconstruction. FINDINGS: -Lung Bases: The lung bases are clear. -Liver: Appears uniform. -Gallbladder: No gallstones identified -Bile Ducts: Not dilated. -Pancreas: Unremarkable. -Spleen: Uniform and normal size. -Stomach: Unremarkable. -Bowel: Normal caliber. No inflammatory changes. -Adrenals: Are normal size. -Kidneys/Ureters: Enhance symmetrically. No hydronephrosis. -Urinary Bladder: Unremarkable. -Reproductive Organs: Uterus and adnexa unremarkable. -Lymph Nodes: No grossly enlarged retroperitoneal, mesenteric, or pelvic adenopathy. -Vasculature: Aorta is normal caliber.  Right ovarian vein is prominent. -Bones: No gross bony lesions. -Other: No ascites. Right ovarian vein is prominent, nonspecific. Otherwise unremarkable CT abdomen and pelvis. XR CHEST PORTABLE    Result Date: 11/25/2022  EXAMINATION: CHEST RADIOGRAPH 11/25/2022 10:55 AM ACCESSION NUMBER: [id number] INDICATION: Sepsis COMPARISON: None available TECHNIQUE: A single view of the chest was obtained. FINDINGS: Support Lines and Tubes: None Cardiac Silhouette: Within normal limits in size. Lungs: No focal airspace disease. Pleura: No pleural effusion. No pneumothorax. Osseous Structures: Unremarkable. Upper Abdomen: Unremarkable. No evidence of acute cardiopulmonary disease. Echocardiogram:  No results found for this or any previous visit. Orders Placed This Encounter   Medications    lactated ringers bolus    DISCONTD: acetaminophen (TYLENOL) tablet 1,000 mg    ondansetron (ZOFRAN) injection 4 mg    ibuprofen (ADVIL;MOTRIN) tablet 800 mg    cefTRIAXone (ROCEPHIN) 1,000 mg in sodium chloride 0.9 % 50 mL IVPB mini-bag     Order Specific Question:   Antimicrobial Indications     Answer:   Urinary Tract Infection     Order Specific Question:   Antimicrobial Indications     Answer: Other     Order Specific Question:   Other Abx Indication     Answer:   Sepsis    ondansetron (ZOFRAN) injection 4 mg    0.9 % sodium chloride bolus    lactated ringers bolus    0.9 % sodium chloride infusion    acetaminophen (TYLENOL) tablet 1,000 mg         Signed:  Karolyn Scales MD    Part of this note may have been written by using a voice dictation software. The note has been proof read but may still contain some grammatical/other typographical errors.

## 2022-11-26 VITALS
TEMPERATURE: 98.2 F | HEART RATE: 95 BPM | OXYGEN SATURATION: 97 % | DIASTOLIC BLOOD PRESSURE: 66 MMHG | RESPIRATION RATE: 20 BRPM | WEIGHT: 160 LBS | BODY MASS INDEX: 27.31 KG/M2 | HEIGHT: 64 IN | SYSTOLIC BLOOD PRESSURE: 109 MMHG

## 2022-11-26 LAB
ANION GAP SERPL CALC-SCNC: 5 MMOL/L (ref 2–11)
BASOPHILS # BLD: 0 K/UL (ref 0–0.2)
BASOPHILS NFR BLD: 0 % (ref 0–2)
BUN SERPL-MCNC: 9 MG/DL (ref 6–23)
CALCIUM SERPL-MCNC: 8.2 MG/DL (ref 8.3–10.4)
CHLORIDE SERPL-SCNC: 112 MMOL/L (ref 101–110)
CO2 SERPL-SCNC: 25 MMOL/L (ref 21–32)
CREAT SERPL-MCNC: 0.54 MG/DL (ref 0.6–1)
DIFFERENTIAL METHOD BLD: ABNORMAL
EOSINOPHIL # BLD: 0.2 K/UL (ref 0–0.8)
EOSINOPHIL NFR BLD: 3 % (ref 0.5–7.8)
ERYTHROCYTE [DISTWIDTH] IN BLOOD BY AUTOMATED COUNT: 13.2 % (ref 11.9–14.6)
GLUCOSE SERPL-MCNC: 108 MG/DL (ref 65–100)
HAV IGM SER QL: NONREACTIVE
HBV CORE IGM SER QL: NONREACTIVE
HBV SURFACE AG SER QL: NONREACTIVE
HCT VFR BLD AUTO: 34.1 % (ref 35.8–46.3)
HCV AB SER QL: NONREACTIVE
HGB BLD-MCNC: 10.9 G/DL (ref 11.7–15.4)
IMM GRANULOCYTES # BLD AUTO: 0 K/UL (ref 0–0.5)
IMM GRANULOCYTES NFR BLD AUTO: 0 % (ref 0–5)
LYMPHOCYTES # BLD: 0.5 K/UL (ref 0.5–4.6)
LYMPHOCYTES NFR BLD: 8 % (ref 13–44)
MCH RBC QN AUTO: 28.2 PG (ref 26.1–32.9)
MCHC RBC AUTO-ENTMCNC: 32 G/DL (ref 31.4–35)
MCV RBC AUTO: 88.1 FL (ref 82–102)
MONOCYTES # BLD: 0.5 K/UL (ref 0.1–1.3)
MONOCYTES NFR BLD: 7 % (ref 4–12)
NEUTS SEG # BLD: 5.6 K/UL (ref 1.7–8.2)
NEUTS SEG NFR BLD: 82 % (ref 43–78)
NRBC # BLD: 0 K/UL (ref 0–0.2)
PLATELET # BLD AUTO: 229 K/UL (ref 150–450)
PMV BLD AUTO: 9.5 FL (ref 9.4–12.3)
POTASSIUM SERPL-SCNC: 3.7 MMOL/L (ref 3.5–5.1)
RBC # BLD AUTO: 3.87 M/UL (ref 4.05–5.2)
SODIUM SERPL-SCNC: 142 MMOL/L (ref 133–143)
WBC # BLD AUTO: 6.8 K/UL (ref 4.3–11.1)

## 2022-11-26 PROCEDURE — 6370000000 HC RX 637 (ALT 250 FOR IP): Performed by: INTERNAL MEDICINE

## 2022-11-26 PROCEDURE — 2580000003 HC RX 258: Performed by: INTERNAL MEDICINE

## 2022-11-26 PROCEDURE — 80048 BASIC METABOLIC PNL TOTAL CA: CPT

## 2022-11-26 PROCEDURE — 36415 COLL VENOUS BLD VENIPUNCTURE: CPT

## 2022-11-26 PROCEDURE — 6360000002 HC RX W HCPCS: Performed by: INTERNAL MEDICINE

## 2022-11-26 PROCEDURE — 85025 COMPLETE CBC W/AUTO DIFF WBC: CPT

## 2022-11-26 RX ORDER — CEFPODOXIME PROXETIL 100 MG/1
100 TABLET, FILM COATED ORAL 2 TIMES DAILY
Qty: 6 TABLET | Refills: 0 | Status: SHIPPED | OUTPATIENT
Start: 2022-11-26 | End: 2022-11-29

## 2022-11-26 RX ADMIN — CEFTRIAXONE SODIUM 1000 MG: 1 INJECTION, POWDER, FOR SOLUTION INTRAMUSCULAR; INTRAVENOUS at 08:51

## 2022-11-26 RX ADMIN — ACETAMINOPHEN 650 MG: 325 TABLET, FILM COATED ORAL at 07:42

## 2022-11-26 RX ADMIN — ENOXAPARIN SODIUM 40 MG: 100 INJECTION SUBCUTANEOUS at 08:51

## 2022-11-26 RX ADMIN — SODIUM CHLORIDE, PRESERVATIVE FREE 10 ML: 5 INJECTION INTRAVENOUS at 09:00

## 2022-11-26 ASSESSMENT — PAIN DESCRIPTION - LOCATION: LOCATION: HEAD

## 2022-11-26 ASSESSMENT — PAIN SCALES - GENERAL: PAINLEVEL_OUTOF10: 1

## 2022-11-26 NOTE — CARE COORDINATION
30 yr-old MF with sepsis. Lives with spouse. Dx sepsis. Started on ceftriaxone. Chart screened by  for discharge planning. No needs identified at this time. Please consult  if any new issues arise. 11/26/22 1230   Service Assessment   Patient Orientation Alert and Oriented   Cognition Alert   History Provided By Patient   Primary Caregiver Self   Accompanied By/Relationship Spouse   Support Systems Spouse/Significant Other   Patient's Healthcare Decision Maker is: Legal Next of Kin   PCP Verified by CM Yes   Last Visit to PCP Within last 3 months   Prior Functional Level Independent in ADLs/IADLs   Current Functional Level Independent in ADLs/IADLs   Can patient return to prior living arrangement Yes   Ability to make needs known: Good   Family able to assist with home care needs: Yes   Would you like for me to discuss the discharge plan with any other family members/significant others, and if so, who?  No   Financial Resources Other (Comment)   Community Resources Other (Comment)

## 2022-11-26 NOTE — PROGRESS NOTES
Hospitalist Progress Note   Admit Date:  2022 10:19 AM   Name:  Prashanth Negron   Age:  30 y.o. Sex:  female  :  1985   MRN:  [id number]   Room:  [facility]/    Presenting Complaint: Nausea     Reason(s) for Admission: Transaminitis [R74.01]  Sepsis ([facility] [address] [A41.9]  Fever, unspecified fever cause [R50.9]     Hospital Course: The patient is a 61-year-old CF with no significant PMH who presented with a chief complaint of fever, chills, dizziness, and general malaise. In the ER, she was found to be septic. Imaging was nonspecific, unremarkable. UA with pyuria but large amount of blood. Slight elevation of liver enzymes. She was admitted to the hospitalist service for further work-up. Subjective & 24hr Events (22): Patient's max temp overnight 37.9 C. She denies urinary symptoms, vomiting, diarrhea. RVP was negative. Acute hepatitis panel was negative. Otherwise, trend LFT and blood cultures. Continue ceftriaxone. Assessment & Plan:     Sepsis, present on admission, of unknown etiology  Pyuria  Meets criteria sepsis with fever, tachycardia and tachypnea. Urinalysis with WBC and leukocyte esterase (large amount of blood on collection)  - continue ceftriaxone  - IVF  - respiratory viral panel (-)  - Bcx NGTD    Transaminitis  - spouse recently had Hep A per patient's report  - patient denies vomiting, diarrhea  - acute hepatitis panel negative      Anticipated discharge needs: None      Diet:  ADULT DIET; Regular  DVT PPx: enoxaparin  Code status: Full Code    Hospital Problems:  Principal Problem:    Sepsis ([facility]  Active Problems:    Pyuria  Resolved Problems:    * No resolved hospital problems.  *      Objective:   Patient Vitals for the past 24 hrs:   Temp Pulse Resp BP SpO2   22 0812 98.6 °F (37 °C) (!) 103 -- 90/68 99 %   22 0324 98.6 °F (37 °C) (!) 106 20 111/77 98 %   22 0130 98.8 °F (37.1 °C) -- -- -- --   22 0015 100.2 °F (37.9 °C) -- -- -- -- 11/25/22 2307 99.3 °F (37.4 °C) (!) 118 18 107/80 98 %   11/25/22 2259 -- 100 18 -- 96 %   11/25/22 1931 98.6 °F (37 °C) (!) 102 20 103/66 97 %   11/25/22 1802 -- (!) 111 -- 94/70 97 %   11/25/22 1714 -- (!) 114 16 104/60 98 %   11/25/22 1712 -- -- -- -- 97 %   11/25/22 1700 -- -- -- -- 97 %   11/25/22 1645 99.6 °F (37.6 °C) -- -- -- --   11/25/22 1643 -- -- -- 103/66 98 %   11/25/22 1514 -- (!) 128 -- 103/71 100 %   11/25/22 1414 -- (!) 118 20 -- 99 %   11/25/22 1410 -- (!) 124 24 -- 99 %   11/25/22 1400 -- (!) 121 15 -- 99 %   11/25/22 1342 -- (!) 119 (!) 48 (!) 94/55 98 %   11/25/22 1330 -- (!) 120 14 -- 96 %   11/25/22 1320 -- (!) 121 12 -- 97 %   11/25/22 1308 -- (!) 123 -- (!) 118/58 99 %   11/25/22 1213 -- (!) 115 16 111/62 99 %   11/25/22 1152 -- -- -- 112/64 100 %   11/25/22 1143 99.3 °F (37.4 °C) (!) 115 19 -- 96 %   11/25/22 1140 -- (!) 117 15 -- 98 %   11/25/22 1117 -- (!) 113 22 -- 97 %   11/25/22 1102 -- (!) 118 (!) 31 -- 97 %   11/25/22 1047 -- (!) 126 (!) 42 -- 96 %   11/25/22 1012 (!) 100.7 °F (38.2 °C) (!) 141 16 98/65 98 %       Oxygen Therapy  SpO2: 99 %  Pulse Oximeter Device Mode: Intermittent  O2 Device: None (Room air)    Estimated body mass index is 27.46 kg/m² as calculated from the following:    Height as of this encounter: 5' 4\" (1.626 m). Weight as of this encounter: 160 lb (72.6 kg). No intake or output data in the 24 hours ending 11/26/22 0950      Physical Exam:   Blood pressure 90/68, pulse (!) 103, temperature 98.6 °F (37 °C), temperature source Oral, resp. rate 20, height 5' 4\" (1.626 m), weight 160 lb (72.6 kg), SpO2 99 %, unknown if currently breastfeeding. General:    No cardiopulmonary distress  Head:  Normocephalic, atraumatic  Eyes:  Sclerae appear normal.  Pupils equally round. ENT:  Nares appear normal, no drainage. Moist oral mucosa  Neck:  No restricted ROM. Trachea midline   CV:   Tachycardia. No m/r/g. Lungs:   CTAB. No wheezing, rhonchi, or rales. Symmetric expansion. Abdomen:   Soft, nontender, nondistended. Extremities: No cyanosis or clubbing. No edema  Skin:     No rashes and normal coloration. Warm and dry. Neuro:  CN II-XII grossly intact. A&Ox3  Psych:  Normal mood and affect.       I have personally reviewed labs and tests showing:  Recent Labs:  Recent Results (from the past 48 hour(s))   EKG 12 Lead    Collection Time: 11/25/22 10:34 AM   Result Value Ref Range    Ventricular Rate 131 BPM    Atrial Rate 131 BPM    P-R Interval 132 ms    QRS Duration 84 ms    Q-T Interval 300 ms    QTc Calculation (Bazett) 443 ms    P Axis 70 degrees    R Axis 62 degrees    T Axis 41 degrees    Diagnosis Sinus tachycardia    Hepatitis Panel, Acute    Collection Time: 11/25/22 10:42 AM   Result Value Ref Range    Hep A IgM NONREACTIVE NR      Hep B Core Ab, IgM NONREACTIVE NR      Hepatitis B Surface Ag NONREACTIVE NR      Hepatitis C Ab NONREACTIVE NR     Comprehensive Metabolic Panel    Collection Time: 11/25/22 10:46 AM   Result Value Ref Range    Sodium 139 133 - 143 mmol/L    Potassium 3.6 3.5 - 5.1 mmol/L    Chloride 107 101 - 110 mmol/L    CO2 24 21 - 32 mmol/L    Anion Gap 8 2 - 11 mmol/L    Glucose 98 65 - 100 mg/dL    BUN 11 6 - 23 MG/DL    Creatinine 0.66 0.6 - 1.0 MG/DL    Est, Glom Filt Rate >60 >60 ml/min/1.73m2    Calcium 8.7 8.3 - 10.4 MG/DL    Total Bilirubin 0.6 0.2 - 1.1 MG/DL     (H) 12 - 65 U/L     (H) 15 - 37 U/L    Alk Phosphatase 112 50 - 130 U/L    Total Protein 7.4 6.3 - 8.2 g/dL    Albumin 3.9 3.5 - 5.0 g/dL    Globulin 3.5 2.8 - 4.5 g/dL    Albumin/Globulin Ratio 1.1 0.4 - 1.6     Blood Culture 1    Collection Time: 11/25/22 10:46 AM    Specimen: Blood   Result Value Ref Range    Special Requests LEFT  Antecubital        Culture NO GROWTH AFTER 16 HOURS     CBC with Auto Differential    Collection Time: 11/25/22 10:46 AM   Result Value Ref Range    WBC 9.9 4.3 - 11.1 K/uL    RBC 4.47 4.05 - 5.2 M/uL    Hemoglobin 12.7 11.7 - 15.4 g/dL    Hematocrit 38.8 35.8 - 46.3 %    MCV 86.8 82.0 - 102.0 FL    MCH 28.4 26.1 - 32.9 PG    MCHC 32.7 31.4 - 35.0 g/dL    RDW 12.9 11.9 - 14.6 %    Platelets 268 414 - 085 K/uL    MPV 9.1 (L) 9.4 - 12.3 FL    nRBC 0.00 0.0 - 0.2 K/uL    Differential Type AUTOMATED      Seg Neutrophils 90 (H) 43 - 78 %    Lymphocytes 3 (L) 13 - 44 %    Monocytes 6 4.0 - 12.0 %    Eosinophils % 1 0.5 - 7.8 %    Basophils 0 0.0 - 2.0 %    Immature Granulocytes 0 0.0 - 5.0 %    Segs Absolute 9.0 (H) 1.7 - 8.2 K/UL    Absolute Lymph # 0.3 (L) 0.5 - 4.6 K/UL    Absolute Mono # 0.6 0.1 - 1.3 K/UL    Absolute Eos # 0.1 0.0 - 0.8 K/UL    Basophils Absolute 0.0 0.0 - 0.2 K/UL    Absolute Immature Granulocyte 0.0 0.0 - 0.5 K/UL   Lactate, Sepsis    Collection Time: 11/25/22 10:46 AM   Result Value Ref Range    Lactic Acid, Sepsis 0.9 0.4 - 2.0 MMOL/L   Procalcitonin    Collection Time: 11/25/22 10:46 AM   Result Value Ref Range    Procalcitonin 0.19 0.00 - 0.49 ng/mL   COVID-19, Rapid    Collection Time: 11/25/22 10:46 AM    Specimen: Nasopharyngeal   Result Value Ref Range    Source Nasopharyngeal      SARS-CoV-2, Rapid Not detected NOTD     Rapid influenza A/B antigens    Collection Time: 11/25/22 10:46 AM    Specimen: Nasal Washing   Result Value Ref Range    Influenza A Ag Negative NEG      Influenza B Ag Negative NEG      Source Nasopharyngeal     Pregnancy, Urine    Collection Time: 11/25/22 10:46 AM   Result Value Ref Range    HCG(Urine) Pregnancy Test Negative NEG     Lipase    Collection Time: 11/25/22 10:46 AM   Result Value Ref Range    Lipase 106 73 - 393 U/L   Blood Culture 2    Collection Time: 11/25/22 11:02 AM    Specimen: Blood   Result Value Ref Range    Special Requests RIGHT  Antecubital        Culture NO GROWTH AFTER 16 HOURS     POC Pregnancy Urine Qual    Collection Time: 11/25/22 11:05 AM   Result Value Ref Range    Preg Test, Ur Negative NEG     Urinalysis w rflx microscopic    Collection Time: 11/25/22 11:07 AM   Result Value Ref Range    Color, UA YELLOW/STRAW      Appearance CLOUDY      Specific Gravity, UA 1.017 1.001 - 1.023      pH, Urine 5.0 5.0 - 9.0      Protein, UA Negative NEG mg/dL    Glucose, UA Negative mg/dL    Ketones, Urine Negative NEG mg/dL    Bilirubin Urine Negative NEG      Blood, Urine LARGE (A) NEG      Urobilinogen, Urine 0.2 0.2 - 1.0 EU/dL    Nitrite, Urine Negative NEG      Leukocyte Esterase, Urine MODERATE (A) NEG      WBC, UA 20-50 (A) U4 /hpf    RBC, UA >100 (A) U5 /hpf    Epithelial Cells UA 0-5 U5 /hpf    BACTERIA, URINE Negative NEG /hpf    Casts 0-2 U2 /lpf   Lactate, Sepsis    Collection Time: 11/25/22 12:40 PM   Result Value Ref Range    Lactic Acid, Sepsis 0.9 0.4 - 2.0 MMOL/L   Respiratory Panel, Molecular, with COVID-19 (Restricted: peds pts or suitable admitted adults)    Collection Time: 11/25/22  3:33 PM    Specimen: Nasopharyngeal   Result Value Ref Range    Adenovirus by PCR NOT DETECTED NOTDET      Coronavirus 229E by PCR NOT DETECTED NOTDET      Coronavirus HKU1 by PCR NOT DETECTED NOTDET      Coronavirus NL63 by PCR NOT DETECTED NOTDET      Coronavirus OC43 by PCR NOT DETECTED NOTDET      SARS-CoV-2, PCR NOT DETECTED NOTDET      Human Metapneumovirus by PCR NOT DETECTED NOTDET      Rhinovirus Enterovirus PCR NOT DETECTED NOTDET      Influenza A by PCR NOT DETECTED NOTDET      Influenza B PCR NOT DETECTED NOTDET      Parainfluenza 1 PCR NOT DETECTED NOTDET      Parainfluenza 2 PCR NOT DETECTED NOTDET      Parainfluenza 3 PCR NOT DETECTED NOTDET      Parainfluenza 4 PCR NOT DETECTED NOTDET      Respiratory Syncytial Virus by PCR NOT DETECTED NOTDET      Bordetella parapertussis by PCR NOT DETECTED NOTDET      Bordetella pertussis by PCR NOT DETECTED NOTDET      Chlamydophila Pneumonia PCR NOT DETECTED NOTDET      Mycoplasma pneumo by PCR NOT DETECTED NOTDET     Basic Metabolic Panel w/ Reflex to MG    Collection Time: 11/26/22  4:49 AM   Result Value Ref Range Sodium 142 133 - 143 mmol/L    Potassium 3.7 3.5 - 5.1 mmol/L    Chloride 112 (H) 101 - 110 mmol/L    CO2 25 21 - 32 mmol/L    Anion Gap 5 2 - 11 mmol/L    Glucose 108 (H) 65 - 100 mg/dL    BUN 9 6 - 23 MG/DL    Creatinine 0.54 (L) 0.6 - 1.0 MG/DL    Est, Glom Filt Rate >60 >60 ml/min/1.73m2    Calcium 8.2 (L) 8.3 - 10.4 MG/DL   CBC with Auto Differential    Collection Time: 11/26/22  4:49 AM   Result Value Ref Range    WBC 6.8 4.3 - 11.1 K/uL    RBC 3.87 (L) 4.05 - 5.2 M/uL    Hemoglobin 10.9 (L) 11.7 - 15.4 g/dL    Hematocrit 34.1 (L) 35.8 - 46.3 %    MCV 88.1 82.0 - 102.0 FL    MCH 28.2 26.1 - 32.9 PG    MCHC 32.0 31.4 - 35.0 g/dL    RDW 13.2 11.9 - 14.6 %    Platelets 215 495 - 212 K/uL    MPV 9.5 9.4 - 12.3 FL    nRBC 0.00 0.0 - 0.2 K/uL    Differential Type AUTOMATED      Seg Neutrophils 82 (H) 43 - 78 %    Lymphocytes 8 (L) 13 - 44 %    Monocytes 7 4.0 - 12.0 %    Eosinophils % 3 0.5 - 7.8 %    Basophils 0 0.0 - 2.0 %    Immature Granulocytes 0 0.0 - 5.0 %    Segs Absolute 5.6 1.7 - 8.2 K/UL    Absolute Lymph # 0.5 0.5 - 4.6 K/UL    Absolute Mono # 0.5 0.1 - 1.3 K/UL    Absolute Eos # 0.2 0.0 - 0.8 K/UL    Basophils Absolute 0.0 0.0 - 0.2 K/UL    Absolute Immature Granulocyte 0.0 0.0 - 0.5 K/UL       I have personally reviewed imaging studies showing: Other Studies:  US PELVIS COMPLETE   Final Result      1. Nonvisualization left ovary, possibly obscured by bowel gas. 2.  Trace free pelvic fluid which may be physiologic. CT ABDOMEN PELVIS W IV CONTRAST Additional Contrast? None   Final Result   Right ovarian vein is prominent, nonspecific. Otherwise   unremarkable CT abdomen and pelvis. XR CHEST PORTABLE   Final Result      No evidence of acute cardiopulmonary disease.              Current Meds:  Current Facility-Administered Medications   Medication Dose Route Frequency    0.9 % sodium chloride infusion   IntraVENous Continuous    sodium chloride flush 0.9 % injection 5-40 mL  5-40 mL IntraVENous 2 times per day    sodium chloride flush 0.9 % injection 5-40 mL  5-40 mL IntraVENous PRN    0.9 % sodium chloride infusion   IntraVENous PRN    potassium chloride (KLOR-CON M) extended release tablet 40 mEq  40 mEq Oral PRN    Or    potassium bicarb-citric acid (EFFER-K) effervescent tablet 40 mEq  40 mEq Oral PRN    Or    potassium chloride 10 mEq/100 mL IVPB (Peripheral Line)  10 mEq IntraVENous PRN    potassium chloride 10 mEq/100 mL IVPB (Peripheral Line)  10 mEq IntraVENous PRN    magnesium sulfate 2000 mg in 50 mL IVPB premix  2,000 mg IntraVENous PRN    ondansetron (ZOFRAN-ODT) disintegrating tablet 4 mg  4 mg Oral Q8H PRN    Or    ondansetron (ZOFRAN) injection 4 mg  4 mg IntraVENous Q6H PRN    polyethylene glycol (GLYCOLAX) packet 17 g  17 g Oral Daily PRN    bisacodyl (DULCOLAX) suppository 10 mg  10 mg Rectal Daily PRN    famotidine (PEPCID) tablet 10 mg  10 mg Oral Daily PRN    aluminum & magnesium hydroxide-simethicone (MAALOX) 200-200-20 MG/5ML suspension 30 mL  30 mL Oral Q6H PRN    acetaminophen (TYLENOL) tablet 650 mg  650 mg Oral Q6H PRN    Or    acetaminophen (TYLENOL) suppository 650 mg  650 mg Rectal Q6H PRN    enoxaparin (LOVENOX) injection 40 mg  40 mg SubCUTAneous Daily    cefTRIAXone (ROCEPHIN) 1,000 mg in sodium chloride 0.9 % 50 mL IVPB mini-bag  1,000 mg IntraVENous Daily    0.9 % sodium chloride bolus  500 mL IntraVENous Once    miconazole (MICOTIN) 2 % vaginal cream 1 applicator  1 applicator Vaginal BID PRN     Facility-Administered Medications Ordered in Other Encounters   Medication Dose Route Frequency    sodium chloride flush 0.9 % injection 5-40 mL  5-40 mL IntraVENous BID       Signed:  Hina Morel, APRN - CNP    Part of this note may have been written by using a voice dictation software. The note has been proof read but may still contain some grammatical/other typographical errors.

## 2022-11-26 NOTE — DISCHARGE SUMMARY
Hospitalist Discharge Summary   Admit Date:  2022 10:19 AM   DC Note date: 2022  Name:  Prashanth Negron   Age:  30 y.o. Sex:  female  :  1985   MRN:  [id number]   Room:  [facility]  PCP:  Pineda Bradshaw MD    Presenting Complaint: Nausea     Initial Admission Diagnosis: Transaminitis [R74.01]  Sepsis ([address] [A41.9]  Fever, unspecified fever cause [R50.9]     Problem List for this Hospitalization (present on admission):    Principal Problem:    Sepsis ([address]  Active Problems:    Pyuria  Resolved Problems:    * No resolved hospital problems. [facility] Course:  Mrs. Chrystine Goldmann is a 57-year-old CF with no significant PMH who presented with a chief complaint of fever, chills, dizziness, and general malaise. In the ER, she was found to be septic. Imaging was nonspecific, unremarkable. UA with pyuria but large amount of blood. Slight elevation of liver enzymes. She was admitted to the hospitalist service for further work-up. Acute hepatitis panel was negative. Respiratory viral panel was negative. On , the patient said that she felt better and would like to go home. She did not want to stay until the next morning. She is in no distress. She denies diarrhea and vomiting. She denies any flu-like symptoms and is ambulating in the room. She understands that if her blood cultures result positive then she will be called back to the ER. She was instructed to complete 3 more days of cefpodoxime post discharge. We encouraged her to follow up with her primary provider in 2 weeks. She was also told that she may return here if her symptoms return. Mrs. Chrystine Goldmann will be discharged home on . A&P  Sepsis, present on admission, of unknown etiology  Pyuria  Meets criteria sepsis with fever, tachycardia and tachypnea.   Urinalysis with WBC and leukocyte esterase (large amount of blood on collection)  - continue ceftriaxone while inpatient  - IVF  - respiratory viral panel (-)  - Bcx NGTD Transaminitis  - spouse recently had Hep A per patient's report  - patient denies vomiting, diarrhea  - acute hepatitis panel negative      Disposition: Home  Diet: ADULT DIET; Regular  Code Status: Full Code    Follow Ups:   Follow-up Information     Rachael Wilkins MD Follow up in 2 week(s). Specialty: Obstetrics & Gynecology  Why: As needed, If symptoms worsen  Contact information:  [address] [place] [address] Rd  [phone]                       Time spent in patient discharge and coordination 34 minutes. Plan was discussed with the patient. All questions answered. Patient was stable at time of discharge. Instructions given to call a physician or return if any concerns. Current Discharge Medication List        START taking these medications    Details   cefpodoxime (VANTIN) 100 MG tablet Take 1 tablet by mouth 2 times daily for 3 days  Qty: 6 tablet, Refills: 0           CONTINUE these medications which have NOT CHANGED    Details   esomeprazole Magnesium (NEXIUM) 20 MG PACK Take 20 mg by mouth 2 times daily           STOP taking these medications       ibuprofen (ADVIL;MOTRIN) 800 MG tablet Comments:   Reason for Stopping:         Prenatal Vit w/Tc-Tkcyglkpo-KJ (PNV PO) Comments:   Reason for Stopping:               Procedures done this admission:  * No surgery found *    Consults this admission:  None    Echocardiogram results:  No results found for this or any previous visit. Diagnostic Imaging/Tests:   US PELVIS COMPLETE    Result Date: 11/25/2022  1. Nonvisualization left ovary, possibly obscured by bowel gas. 2.  Trace free pelvic fluid which may be physiologic. CT ABDOMEN PELVIS W IV CONTRAST Additional Contrast? None    Result Date: 11/25/2022  Right ovarian vein is prominent, nonspecific. Otherwise unremarkable CT abdomen and pelvis. XR CHEST PORTABLE    Result Date: 11/25/2022  No evidence of acute cardiopulmonary disease.         Labs: Results:       BMP, Mg, Phos Recent Labs     11/25/22  1046 11/26/22  0449    142   K 3.6 3.7    112*   CO2 24 25   ANIONGAP 8 5   BUN 11 9   CREATININE 0.66 0.54*   LABGLOM >60 >60   CALCIUM 8.7 8.2*   GLUCOSE 98 108*      CBC Recent Labs     11/25/22  1046 11/26/22  0449   WBC 9.9 6.8   RBC 4.47 3.87*   HGB 12.7 10.9*   HCT 38.8 34.1*   MCV 86.8 88.1   MCH 28.4 28.2   MCHC 32.7 32.0   RDW 12.9 13.2    229   MPV 9.1* 9.5   NRBC 0.00 0.00   SEGS 90* 82*   LYMPHOPCT 3* 8*   EOSRELPCT 1 3   MONOPCT 6 7   BASOPCT 0 0   IMMGRAN 0 0   SEGSABS 9.0* 5.6   LYMPHSABS 0.3* 0.5   EOSABS 0.1 0.2   MONOSABS 0.6 0.5   BASOSABS 0.0 0.0   ABSIMMGRAN 0.0 0.0      LFT Recent Labs     11/25/22  1046   BILITOT 0.6   ALKPHOS 112   *   *   PROT 7.4   LABALBU 3.9   GLOB 3.5      Cardiac  No results found for: NTPROBNP, TROPHS   Coags No results found for: PROTIME, INR, APTT   A1c No results found for: LABA1C, EAG   Lipids No results found for: CHOL, LDLCALC, LABVLDL, HDL, CHOLHDLRATIO, TRIG   Thyroid  No results found for: Tari Thomas     Most Recent UA Lab Results   Component Value Date/Time    COLORU YELLOW/STRAW 11/25/2022 11:07 AM    APPEARANCE CLOUDY 11/25/2022 11:07 AM    SPECGRAV 1.017 11/25/2022 11:07 AM    LABPH 5.0 11/25/2022 11:07 AM    PROTEINU Negative 11/25/2022 11:07 AM    GLUCOSEU Negative 11/25/2022 11:07 AM    KETUA Negative 11/25/2022 11:07 AM    BILIRUBINUR Negative 11/25/2022 11:07 AM    BLOODU LARGE 11/25/2022 11:07 AM    UROBILINOGEN 0.2 11/25/2022 11:07 AM    NITRU Negative 11/25/2022 11:07 AM    LEUKOCYTESUR MODERATE 11/25/2022 11:07 AM    WBCUA 20-50 11/25/2022 11:07 AM    RBCUA >100 11/25/2022 11:07 AM    EPITHUA 0-5 11/25/2022 11:07 AM    BACTERIA Negative 11/25/2022 11:07 AM    LABCAST 0-2 11/25/2022 11:07 AM        Recent Labs     11/25/22  1102 11/25/22  1046   CULTURE NO GROWTH AFTER 16 HOURS NO GROWTH AFTER 16 HOURS       All Labs from Last 24 Hrs:  Recent Results (from the past 24 hour(s))   Lactate, Sepsis    Collection Time: 11/25/22 12:40 PM   Result Value Ref Range    Lactic Acid, Sepsis 0.9 0.4 - 2.0 MMOL/L   Respiratory Panel, Molecular, with COVID-19 (Restricted: peds pts or suitable admitted adults)    Collection Time: 11/25/22  3:33 PM    Specimen: Nasopharyngeal   Result Value Ref Range    Adenovirus by PCR NOT DETECTED NOTDET      Coronavirus 229E by PCR NOT DETECTED NOTDET      Coronavirus HKU1 by PCR NOT DETECTED NOTDET      Coronavirus NL63 by PCR NOT DETECTED NOTDET      Coronavirus OC43 by PCR NOT DETECTED NOTDET      SARS-CoV-2, PCR NOT DETECTED NOTDET      Human Metapneumovirus by PCR NOT DETECTED NOTDET      Rhinovirus Enterovirus PCR NOT DETECTED NOTDET      Influenza A by PCR NOT DETECTED NOTDET      Influenza B PCR NOT DETECTED NOTDET      Parainfluenza 1 PCR NOT DETECTED NOTDET      Parainfluenza 2 PCR NOT DETECTED NOTDET      Parainfluenza 3 PCR NOT DETECTED NOTDET      Parainfluenza 4 PCR NOT DETECTED NOTDET      Respiratory Syncytial Virus by PCR NOT DETECTED NOTDET      Bordetella parapertussis by PCR NOT DETECTED NOTDET      Bordetella pertussis by PCR NOT DETECTED NOTDET      Chlamydophila Pneumonia PCR NOT DETECTED NOTDET      Mycoplasma pneumo by PCR NOT DETECTED NOTDET     Basic Metabolic Panel w/ Reflex to MG    Collection Time: 11/26/22  4:49 AM   Result Value Ref Range    Sodium 142 133 - 143 mmol/L    Potassium 3.7 3.5 - 5.1 mmol/L    Chloride 112 (H) 101 - 110 mmol/L    CO2 25 21 - 32 mmol/L    Anion Gap 5 2 - 11 mmol/L    Glucose 108 (H) 65 - 100 mg/dL    BUN 9 6 - 23 MG/DL    Creatinine 0.54 (L) 0.6 - 1.0 MG/DL    Est, Glom Filt Rate >60 >60 ml/min/1.73m2    Calcium 8.2 (L) 8.3 - 10.4 MG/DL   CBC with Auto Differential    Collection Time: 11/26/22  4:49 AM   Result Value Ref Range    WBC 6.8 4.3 - 11.1 K/uL    RBC 3.87 (L) 4.05 - 5.2 M/uL    Hemoglobin 10.9 (L) 11.7 - 15.4 g/dL    Hematocrit 34.1 (L) 35.8 - 46.3 %    MCV 88.1 82.0 - 102.0 FL    MCH 28.2 26.1 - 32.9 PG    MCHC 32.0 31.4 - 35.0 g/dL    RDW 13.2 11.9 - 14.6 %    Platelets 097 801 - 547 K/uL    MPV 9.5 9.4 - 12.3 FL    nRBC 0.00 0.0 - 0.2 K/uL    Differential Type AUTOMATED      Seg Neutrophils 82 (H) 43 - 78 %    Lymphocytes 8 (L) 13 - 44 %    Monocytes 7 4.0 - 12.0 %    Eosinophils % 3 0.5 - 7.8 %    Basophils 0 0.0 - 2.0 %    Immature Granulocytes 0 0.0 - 5.0 %    Segs Absolute 5.6 1.7 - 8.2 K/UL    Absolute Lymph # 0.5 0.5 - 4.6 K/UL    Absolute Mono # 0.5 0.1 - 1.3 K/UL    Absolute Eos # 0.2 0.0 - 0.8 K/UL    Basophils Absolute 0.0 0.0 - 0.2 K/UL    Absolute Immature Granulocyte 0.0 0.0 - 0.5 K/UL       Allergies   Allergen Reactions    Amoxicillin Hives     Pt sts had rash last time she had amoxicillin for strep     Immunization History   Administered Date(s) Administered    Tdap (Boostrix, Adacel) 12/14/2007, 10/31/2016, 03/19/2018, 03/15/2021       Recent Vital Data:  Patient Vitals for the past 24 hrs:   Temp Pulse Resp BP SpO2   11/26/22 1107 98.2 °F (36.8 °C) 95 -- 109/66 97 %   11/26/22 0812 98.6 °F (37 °C) (!) 103 -- 90/68 99 %   11/26/22 0324 98.6 °F (37 °C) (!) 106 20 111/77 98 %   11/26/22 0130 98.8 °F (37.1 °C) -- -- -- --   11/26/22 0015 100.2 °F (37.9 °C) -- -- -- --   11/25/22 2307 99.3 °F (37.4 °C) (!) 118 18 107/80 98 %   11/25/22 2259 -- 100 18 -- 96 %   11/25/22 1931 98.6 °F (37 °C) (!) 102 20 103/66 97 %   11/25/22 1802 -- (!) 111 -- 94/70 97 %   11/25/22 1714 -- (!) 114 16 104/60 98 %   11/25/22 1712 -- -- -- -- 97 %   11/25/22 1700 -- -- -- -- 97 %   11/25/22 1645 99.6 °F (37.6 °C) -- -- -- --   11/25/22 1643 -- -- -- 103/66 98 %   11/25/22 1514 -- (!) 128 -- 103/71 100 %   11/25/22 1414 -- (!) 118 20 -- 99 %   11/25/22 1410 -- (!) 124 24 -- 99 %   11/25/22 1400 -- (!) 121 15 -- 99 %   11/25/22 1342 -- (!) 119 (!) 48 (!) 94/55 98 %   11/25/22 1330 -- (!) 120 14 -- 96 %   11/25/22 1320 -- (!) 121 12 -- 97 %   11/25/22 1308 -- (!) 123 -- (!) 118/58 99 %       Oxygen Therapy  SpO2: 97 %  Pulse Oximeter Device Mode: Intermittent  O2 Device: None (Room air)    Estimated body mass index is 27.46 kg/m² as calculated from the following:    Height as of this encounter: 5' 4\" (1.626 m). Weight as of this encounter: 160 lb (72.6 kg). No intake or output data in the 24 hours ending 11/26/22 1230      Physical Exam:  General:          No cardiopulmonary distress  Head:               Normocephalic, atraumatic  Eyes:               Sclerae appear normal.  Pupils equally round. ENT:                Nares appear normal, no drainage. Moist oral mucosa  Neck:               No restricted ROM. Trachea midline   CV:                  Tachycardia. No m/r/g. Lungs:             CTAB. No wheezing, rhonchi, or rales. Symmetric expansion. Abdomen:        Soft, nontender, nondistended. Extremities:     No cyanosis or clubbing. No edema  Skin:                No rashes and normal coloration. Warm and dry. Neuro:             CN II-XII grossly intact. A&Ox3  Psych:             Normal mood and affect. Signed:  ANTHONY Hutton CNP    Part of this note may have been written by using a voice dictation software. The note has been proof read but may still contain some grammatical/other typographical errors.

## 2025-02-13 ENCOUNTER — OFFICE VISIT (OUTPATIENT)
Dept: UROLOGY | Age: 40
End: 2025-02-13
Payer: OTHER GOVERNMENT

## 2025-02-13 DIAGNOSIS — R10.2 FEMALE PELVIC PAIN: ICD-10-CM

## 2025-02-13 DIAGNOSIS — R10.2 FEMALE PELVIC PAIN: Primary | ICD-10-CM

## 2025-02-13 DIAGNOSIS — R35.0 URINARY FREQUENCY: ICD-10-CM

## 2025-02-13 LAB
BILIRUBIN, URINE, POC: NEGATIVE
BLOOD URINE, POC: NORMAL
GLUCOSE URINE, POC: NEGATIVE MG/DL
KETONES, URINE, POC: NEGATIVE MG/DL
LEUKOCYTE ESTERASE, URINE, POC: NEGATIVE
NITRITE, URINE, POC: NEGATIVE
PH, URINE, POC: 5 (ref 4.6–8)
PROTEIN,URINE, POC: NEGATIVE MG/DL
SPECIFIC GRAVITY, URINE, POC: 1.01 (ref 1–1.03)
URINALYSIS CLARITY, POC: NORMAL
URINALYSIS COLOR, POC: NORMAL
UROBILINOGEN, POC: NORMAL MG/DL

## 2025-02-13 PROCEDURE — 99204 OFFICE O/P NEW MOD 45 MIN: CPT | Performed by: NURSE PRACTITIONER

## 2025-02-13 PROCEDURE — 81003 URINALYSIS AUTO W/O SCOPE: CPT | Performed by: NURSE PRACTITIONER

## 2025-02-13 RX ORDER — PANTOPRAZOLE SODIUM 40 MG/1
40 TABLET, DELAYED RELEASE ORAL DAILY
COMMUNITY
Start: 2025-01-13

## 2025-02-13 RX ORDER — PHENAZOPYRIDINE HYDROCHLORIDE 100 MG/1
TABLET, FILM COATED ORAL
Qty: 6 TABLET | Refills: 3 | OUTPATIENT
Start: 2025-02-13

## 2025-02-13 RX ORDER — METHENAMINE, SODIUM PHOSPHATE, MONOBASIC, ANHYDROUS, PHENYL SALICYLATE, METHYLENE BLUE AND HYOSCYAMINE SULFATE 118; 40.8; 36; 10; .12 MG/1; MG/1; MG/1; MG/1; MG/1
1 CAPSULE ORAL 4 TIMES DAILY PRN
Qty: 120 CAPSULE | Refills: 3 | Status: SHIPPED | OUTPATIENT
Start: 2025-02-13

## 2025-02-13 ASSESSMENT — ENCOUNTER SYMPTOMS
BACK PAIN: 0
DIARRHEA: 0
EYE PAIN: 0
ABDOMINAL PAIN: 0
WHEEZING: 0
COUGH: 0
INDIGESTION: 0
CONSTIPATION: 0
SHORTNESS OF BREATH: 0
VOMITING: 0
HEARTBURN: 0
SKIN LESIONS: 0
NAUSEA: 0
EYE DISCHARGE: 0
BLOOD IN STOOL: 0

## 2025-02-13 NOTE — PROGRESS NOTES
uro  [facility]  [address] [place] [address]  [phone]          Funmilayo Solano  : 1985    Chief Complaint   Patient presents with    Urinary Tract Infection    Cystitis    New Patient          EDGAR Solano is a 39 y.o. female  Here today referred by primary care due to ongoing issues with pelvic pain and bladder pain.  Patient recently moved back to [place] after being in [place] for a couple years.  She was there with her family secondary to  assignment.  While in [place] she had numerous episodes of urinary infections.  She was given antibiotics for bladder infections and typically would see some improvement with that.  She admits to taking baths.  And wonders if the house that they were residing in [place] possibly caused some of the issues.  Today she is completely asymptomatic.    The month of December she was in and out of urgent care a couple different times.  She had a urine check done 2025 that was negative.  She also had repeat urine checked 2025 secondary to continued symptoms.  The culture however came back negative.  No antibiotic was needed.    Patient describes the pain to be like menstrual cramping.  The pain causes her to void frequently.  A full bladder does not always necessarily make the pain worse but she does get little relief if she empties the bladder.  She denies any urinary incontinence.  She is not having any significant urethral pain.  There has been no fever chills or gross hematuria.  It was mentioned to her that she could possibly have a condition called interstitial cystitis.  Here today for evaluation to discuss.  Her urine today is clear.    She is accompanied by her spouse.     Past Medical History:   Diagnosis Date    Anemia     UTI (urinary tract infection)     Can’t remember     Past Surgical History:   Procedure Laterality Date    WISDOM TOOTH EXTRACTION       Current Outpatient Medications   Medication Sig

## 2025-03-19 ENCOUNTER — CLINICAL SUPPORT (OUTPATIENT)
Dept: UROLOGY | Age: 40
End: 2025-03-19
Payer: OTHER GOVERNMENT

## 2025-03-19 DIAGNOSIS — R10.2 FEMALE PELVIC PAIN: ICD-10-CM

## 2025-03-19 DIAGNOSIS — R35.0 URINARY FREQUENCY: Primary | ICD-10-CM

## 2025-03-19 LAB
BILIRUBIN, URINE, POC: NEGATIVE
BLOOD URINE, POC: NEGATIVE
GLUCOSE URINE, POC: NEGATIVE MG/DL
KETONES, URINE, POC: NEGATIVE MG/DL
LEUKOCYTE ESTERASE, URINE, POC: NEGATIVE
NITRITE, URINE, POC: NEGATIVE
PH, URINE, POC: 5.5 (ref 4.6–8)
PROTEIN,URINE, POC: NEGATIVE MG/DL
SPECIFIC GRAVITY, URINE, POC: 1.02 (ref 1–1.03)
URINALYSIS CLARITY, POC: NORMAL
URINALYSIS COLOR, POC: NORMAL
UROBILINOGEN, POC: NORMAL MG/DL

## 2025-03-19 PROCEDURE — 81003 URINALYSIS AUTO W/O SCOPE: CPT | Performed by: NURSE PRACTITIONER

## 2025-03-19 PROCEDURE — 99211 OFF/OP EST MAY X REQ PHY/QHP: CPT | Performed by: NURSE PRACTITIONER

## 2025-03-19 NOTE — PROGRESS NOTES
UA - Dipstick  Results for orders placed or performed in visit on 03/19/25   AMB POC URINALYSIS DIP STICK AUTO W/O MICRO    Collection Time: 03/19/25  2:08 PM   Result Value Ref Range    Color (UA POC)      Clarity (UA POC)      Glucose, Urine, POC Negative Negative mg/dL    Bilirubin, Urine, POC Negative Negative    KETONES, Urine, POC Negative Negative mg/dL    Specific Gravity, Urine, POC 1.020 1.001 - 1.035    Blood (UA POC) Negative     pH, Urine, POC 5.5 4.6 - 8.0    Protein, Urine, POC Negative Negative mg/dL    Urobilinogen, POC 0.2 mg/dL <1.1 mg/dL    Nitrite, Urine, POC Negative Negative    Leukocyte Esterase, Urine, POC Negative Negative         Requested Prescriptions      No prescriptions requested or ordered in this encounter     Plan    Diagnosis Orders   1. Urinary frequency  AMB POC URINALYSIS DIP STICK AUTO W/O MICRO      2. Female pelvic pain  AMB POC URINALYSIS DIP STICK AUTO W/O MICRO        Urine is clear.

## 2025-08-01 ENCOUNTER — HOSPITAL ENCOUNTER (OUTPATIENT)
Dept: PHYSICAL THERAPY | Age: 40
Setting detail: RECURRING SERIES
Discharge: HOME | End: 2025-08-04
Payer: OTHER GOVERNMENT

## 2025-08-01 DIAGNOSIS — R27.8 OTHER LACK OF COORDINATION: ICD-10-CM

## 2025-08-01 DIAGNOSIS — R39.89 OTHER SYMPTOMS AND SIGNS INVOLVING THE GENITOURINARY SYSTEM: Primary | ICD-10-CM

## 2025-08-01 PROCEDURE — 97161 PT EVAL LOW COMPLEX 20 MIN: CPT

## 2025-08-01 PROCEDURE — 97530 THERAPEUTIC ACTIVITIES: CPT

## 2025-08-01 ASSESSMENT — PAIN SCALES - GENERAL: PAINLEVEL_OUTOF10: 0

## 2025-08-06 ENCOUNTER — HOSPITAL ENCOUNTER (OUTPATIENT)
Dept: PHYSICAL THERAPY | Age: 40
Setting detail: RECURRING SERIES
Discharge: HOME | End: 2025-08-09
Payer: OTHER GOVERNMENT

## 2025-08-06 PROCEDURE — 97530 THERAPEUTIC ACTIVITIES: CPT

## 2025-08-06 PROCEDURE — 97110 THERAPEUTIC EXERCISES: CPT

## 2025-08-06 ASSESSMENT — PAIN SCALES - GENERAL: PAINLEVEL_OUTOF10: 0

## 2025-08-20 ENCOUNTER — APPOINTMENT (OUTPATIENT)
Dept: PHYSICAL THERAPY | Age: 40
End: 2025-08-20
Payer: OTHER GOVERNMENT